# Patient Record
Sex: FEMALE | Race: WHITE | NOT HISPANIC OR LATINO
[De-identification: names, ages, dates, MRNs, and addresses within clinical notes are randomized per-mention and may not be internally consistent; named-entity substitution may affect disease eponyms.]

---

## 2018-01-12 ENCOUNTER — APPOINTMENT (OUTPATIENT)
Dept: PULMONOLOGY | Facility: CLINIC | Age: 63
End: 2018-01-12
Payer: MEDICARE

## 2018-01-12 VITALS
WEIGHT: 135 LBS | SYSTOLIC BLOOD PRESSURE: 112 MMHG | BODY MASS INDEX: 24.84 KG/M2 | HEART RATE: 90 BPM | DIASTOLIC BLOOD PRESSURE: 70 MMHG | HEIGHT: 62 IN | OXYGEN SATURATION: 98 % | RESPIRATION RATE: 14 BRPM

## 2018-01-12 DIAGNOSIS — Z86.79 PERSONAL HISTORY OF OTHER DISEASES OF THE CIRCULATORY SYSTEM: ICD-10-CM

## 2018-01-12 DIAGNOSIS — F17.200 NICOTINE DEPENDENCE, UNSPECIFIED, UNCOMPLICATED: ICD-10-CM

## 2018-01-12 DIAGNOSIS — Z86.39 PERSONAL HISTORY OF OTHER ENDOCRINE, NUTRITIONAL AND METABOLIC DISEASE: ICD-10-CM

## 2018-01-12 PROCEDURE — 94729 DIFFUSING CAPACITY: CPT

## 2018-01-12 PROCEDURE — 94060 EVALUATION OF WHEEZING: CPT

## 2018-01-12 PROCEDURE — 99205 OFFICE O/P NEW HI 60 MIN: CPT | Mod: 25

## 2018-01-12 PROCEDURE — 94727 GAS DIL/WSHOT DETER LNG VOL: CPT

## 2018-01-12 RX ORDER — LISINOPRIL 2.5 MG/1
2.5 TABLET ORAL
Refills: 0 | Status: ACTIVE | COMMUNITY

## 2018-01-12 RX ORDER — ATORVASTATIN CALCIUM 40 MG/1
40 TABLET, FILM COATED ORAL
Refills: 0 | Status: ACTIVE | COMMUNITY

## 2018-01-12 RX ORDER — ASPIRIN 81 MG
81 TABLET, DELAYED RELEASE (ENTERIC COATED) ORAL
Refills: 0 | Status: ACTIVE | COMMUNITY

## 2018-01-22 ENCOUNTER — APPOINTMENT (OUTPATIENT)
Dept: PULMONOLOGY | Facility: CLINIC | Age: 63
End: 2018-01-22
Payer: MEDICARE

## 2018-01-22 VITALS
BODY MASS INDEX: 24.84 KG/M2 | SYSTOLIC BLOOD PRESSURE: 120 MMHG | HEIGHT: 62 IN | TEMPERATURE: 98.2 F | OXYGEN SATURATION: 95 % | RESPIRATION RATE: 16 BRPM | WEIGHT: 135 LBS | HEART RATE: 83 BPM | DIASTOLIC BLOOD PRESSURE: 82 MMHG

## 2018-01-22 PROCEDURE — 99214 OFFICE O/P EST MOD 30 MIN: CPT

## 2018-01-22 RX ORDER — ALBUTEROL SULFATE 90 UG/1
108 (90 BASE) AEROSOL, METERED RESPIRATORY (INHALATION) EVERY 6 HOURS
Qty: 1 | Refills: 2 | Status: ACTIVE | COMMUNITY
Start: 2018-01-22 | End: 1900-01-01

## 2018-02-16 ENCOUNTER — APPOINTMENT (OUTPATIENT)
Dept: PULMONOLOGY | Facility: CLINIC | Age: 63
End: 2018-02-16
Payer: MEDICARE

## 2018-02-16 VITALS
SYSTOLIC BLOOD PRESSURE: 122 MMHG | RESPIRATION RATE: 16 BRPM | HEART RATE: 70 BPM | OXYGEN SATURATION: 95 % | DIASTOLIC BLOOD PRESSURE: 92 MMHG | TEMPERATURE: 97.9 F

## 2018-02-16 DIAGNOSIS — J45.909 UNSPECIFIED ASTHMA, UNCOMPLICATED: ICD-10-CM

## 2018-02-16 PROCEDURE — 99215 OFFICE O/P EST HI 40 MIN: CPT

## 2018-02-16 RX ORDER — MONTELUKAST 10 MG/1
10 TABLET, FILM COATED ORAL
Qty: 30 | Refills: 5 | Status: COMPLETED | COMMUNITY
Start: 2018-01-15 | End: 2018-02-16

## 2019-02-15 ENCOUNTER — TRANSCRIPTION ENCOUNTER (OUTPATIENT)
Age: 64
End: 2019-02-15

## 2019-02-15 ENCOUNTER — INPATIENT (INPATIENT)
Facility: HOSPITAL | Age: 64
LOS: 0 days | Discharge: HOME | DRG: 343 | End: 2019-02-16
Attending: SURGERY | Admitting: SURGERY
Payer: COMMERCIAL

## 2019-02-15 VITALS
HEART RATE: 72 BPM | HEIGHT: 62 IN | OXYGEN SATURATION: 98 % | SYSTOLIC BLOOD PRESSURE: 155 MMHG | DIASTOLIC BLOOD PRESSURE: 90 MMHG | RESPIRATION RATE: 17 BRPM | TEMPERATURE: 98 F | WEIGHT: 134.92 LBS

## 2019-02-15 DIAGNOSIS — K37 UNSPECIFIED APPENDICITIS: ICD-10-CM

## 2019-02-15 LAB
ALBUMIN SERPL ELPH-MCNC: 4.5 G/DL — SIGNIFICANT CHANGE UP (ref 3.3–5)
ALP SERPL-CCNC: 73 U/L — SIGNIFICANT CHANGE UP (ref 40–120)
ALT FLD-CCNC: 40 U/L — SIGNIFICANT CHANGE UP (ref 10–45)
ANION GAP SERPL CALC-SCNC: 14 MMOL/L — SIGNIFICANT CHANGE UP (ref 5–17)
APTT BLD: 25.8 SEC — LOW (ref 27.5–36.3)
AST SERPL-CCNC: 17 U/L — SIGNIFICANT CHANGE UP (ref 10–40)
BASOPHILS # BLD AUTO: 0 K/UL — SIGNIFICANT CHANGE UP (ref 0–0.2)
BASOPHILS NFR BLD AUTO: 0.1 % — SIGNIFICANT CHANGE UP (ref 0–2)
BILIRUB SERPL-MCNC: 0.4 MG/DL — SIGNIFICANT CHANGE UP (ref 0.2–1.2)
BUN SERPL-MCNC: 13 MG/DL — SIGNIFICANT CHANGE UP (ref 7–23)
CALCIUM SERPL-MCNC: 10.1 MG/DL — SIGNIFICANT CHANGE UP (ref 8.4–10.5)
CHLORIDE SERPL-SCNC: 104 MMOL/L — SIGNIFICANT CHANGE UP (ref 96–108)
CO2 SERPL-SCNC: 20 MMOL/L — LOW (ref 22–31)
CREAT SERPL-MCNC: 0.78 MG/DL — SIGNIFICANT CHANGE UP (ref 0.5–1.3)
EOSINOPHIL # BLD AUTO: 0 K/UL — SIGNIFICANT CHANGE UP (ref 0–0.5)
EOSINOPHIL NFR BLD AUTO: 0.2 % — SIGNIFICANT CHANGE UP (ref 0–6)
GLUCOSE SERPL-MCNC: 146 MG/DL — HIGH (ref 70–99)
HCT VFR BLD CALC: 45.8 % — HIGH (ref 34.5–45)
HGB BLD-MCNC: 15.9 G/DL — HIGH (ref 11.5–15.5)
INR BLD: 1.04 RATIO — SIGNIFICANT CHANGE UP (ref 0.88–1.16)
LIDOCAIN IGE QN: 22 U/L — SIGNIFICANT CHANGE UP (ref 7–60)
LYMPHOCYTES # BLD AUTO: 0.7 K/UL — LOW (ref 1–3.3)
LYMPHOCYTES # BLD AUTO: 5.9 % — LOW (ref 13–44)
MCHC RBC-ENTMCNC: 29.4 PG — SIGNIFICANT CHANGE UP (ref 27–34)
MCHC RBC-ENTMCNC: 34.7 GM/DL — SIGNIFICANT CHANGE UP (ref 32–36)
MCV RBC AUTO: 84.8 FL — SIGNIFICANT CHANGE UP (ref 80–100)
MONOCYTES # BLD AUTO: 0.3 K/UL — SIGNIFICANT CHANGE UP (ref 0–0.9)
MONOCYTES NFR BLD AUTO: 3 % — SIGNIFICANT CHANGE UP (ref 2–14)
NEUTROPHILS # BLD AUTO: 10.3 K/UL — HIGH (ref 1.8–7.4)
NEUTROPHILS NFR BLD AUTO: 90.8 % — HIGH (ref 43–77)
PLATELET # BLD AUTO: 185 K/UL — SIGNIFICANT CHANGE UP (ref 150–400)
POTASSIUM SERPL-MCNC: 4.1 MMOL/L — SIGNIFICANT CHANGE UP (ref 3.5–5.3)
POTASSIUM SERPL-SCNC: 4.1 MMOL/L — SIGNIFICANT CHANGE UP (ref 3.5–5.3)
PROT SERPL-MCNC: 7.2 G/DL — SIGNIFICANT CHANGE UP (ref 6–8.3)
PROTHROM AB SERPL-ACNC: 11.9 SEC — SIGNIFICANT CHANGE UP (ref 10–12.9)
RBC # BLD: 5.4 M/UL — HIGH (ref 3.8–5.2)
RBC # FLD: 13.1 % — SIGNIFICANT CHANGE UP (ref 10.3–14.5)
SODIUM SERPL-SCNC: 138 MMOL/L — SIGNIFICANT CHANGE UP (ref 135–145)
WBC # BLD: 11.3 K/UL — HIGH (ref 3.8–10.5)
WBC # FLD AUTO: 11.3 K/UL — HIGH (ref 3.8–10.5)

## 2019-02-15 PROCEDURE — 44970 LAPAROSCOPY APPENDECTOMY: CPT

## 2019-02-15 PROCEDURE — 93010 ELECTROCARDIOGRAM REPORT: CPT

## 2019-02-15 PROCEDURE — 99221 1ST HOSP IP/OBS SF/LOW 40: CPT | Mod: 57

## 2019-02-15 PROCEDURE — 74177 CT ABD & PELVIS W/CONTRAST: CPT | Mod: 26

## 2019-02-15 PROCEDURE — 99285 EMERGENCY DEPT VISIT HI MDM: CPT | Mod: GC,25

## 2019-02-15 RX ORDER — IPRATROPIUM/ALBUTEROL SULFATE 18-103MCG
3 AEROSOL WITH ADAPTER (GRAM) INHALATION EVERY 6 HOURS
Qty: 0 | Refills: 0 | Status: DISCONTINUED | OUTPATIENT
Start: 2019-02-15 | End: 2019-02-16

## 2019-02-15 RX ORDER — FAMOTIDINE 10 MG/ML
40 INJECTION INTRAVENOUS
Qty: 0 | Refills: 0 | Status: DISCONTINUED | OUTPATIENT
Start: 2019-02-16 | End: 2019-02-16

## 2019-02-15 RX ORDER — BUDESONIDE AND FORMOTEROL FUMARATE DIHYDRATE 160; 4.5 UG/1; UG/1
2 AEROSOL RESPIRATORY (INHALATION)
Qty: 0 | Refills: 0 | Status: DISCONTINUED | OUTPATIENT
Start: 2019-02-15 | End: 2019-02-16

## 2019-02-15 RX ORDER — ASPIRIN/CALCIUM CARB/MAGNESIUM 324 MG
81 TABLET ORAL DAILY
Qty: 0 | Refills: 0 | Status: DISCONTINUED | OUTPATIENT
Start: 2019-02-15 | End: 2019-02-15

## 2019-02-15 RX ORDER — PROCHLORPERAZINE MALEATE 5 MG
10 TABLET ORAL ONCE
Qty: 0 | Refills: 0 | Status: COMPLETED | OUTPATIENT
Start: 2019-02-15 | End: 2019-02-15

## 2019-02-15 RX ORDER — SODIUM CHLORIDE 9 MG/ML
3 INJECTION INTRAMUSCULAR; INTRAVENOUS; SUBCUTANEOUS ONCE
Qty: 0 | Refills: 0 | Status: COMPLETED | OUTPATIENT
Start: 2019-02-15 | End: 2019-02-15

## 2019-02-15 RX ORDER — ATORVASTATIN CALCIUM 80 MG/1
40 TABLET, FILM COATED ORAL AT BEDTIME
Qty: 0 | Refills: 0 | Status: DISCONTINUED | OUTPATIENT
Start: 2019-02-15 | End: 2019-02-16

## 2019-02-15 RX ORDER — LIDOCAINE 4 G/100G
15 CREAM TOPICAL ONCE
Qty: 0 | Refills: 0 | Status: COMPLETED | OUTPATIENT
Start: 2019-02-15 | End: 2019-02-15

## 2019-02-15 RX ORDER — FAMOTIDINE 10 MG/ML
20 INJECTION INTRAVENOUS ONCE
Qty: 0 | Refills: 0 | Status: COMPLETED | OUTPATIENT
Start: 2019-02-15 | End: 2019-02-15

## 2019-02-15 RX ORDER — PIPERACILLIN AND TAZOBACTAM 4; .5 G/20ML; G/20ML
3.38 INJECTION, POWDER, LYOPHILIZED, FOR SOLUTION INTRAVENOUS ONCE
Qty: 0 | Refills: 0 | Status: COMPLETED | OUTPATIENT
Start: 2019-02-15 | End: 2019-02-15

## 2019-02-15 RX ORDER — ENOXAPARIN SODIUM 100 MG/ML
40 INJECTION SUBCUTANEOUS DAILY
Qty: 0 | Refills: 0 | Status: DISCONTINUED | OUTPATIENT
Start: 2019-02-15 | End: 2019-02-16

## 2019-02-15 RX ORDER — ONDANSETRON 8 MG/1
4 TABLET, FILM COATED ORAL ONCE
Qty: 0 | Refills: 0 | Status: COMPLETED | OUTPATIENT
Start: 2019-02-15 | End: 2019-02-15

## 2019-02-15 RX ORDER — SODIUM CHLORIDE 9 MG/ML
1000 INJECTION INTRAMUSCULAR; INTRAVENOUS; SUBCUTANEOUS ONCE
Qty: 0 | Refills: 0 | Status: COMPLETED | OUTPATIENT
Start: 2019-02-15 | End: 2019-02-15

## 2019-02-15 RX ORDER — METOCLOPRAMIDE HCL 10 MG
10 TABLET ORAL ONCE
Qty: 0 | Refills: 0 | Status: COMPLETED | OUTPATIENT
Start: 2019-02-15 | End: 2019-02-15

## 2019-02-15 RX ORDER — LISINOPRIL 2.5 MG/1
2.5 TABLET ORAL DAILY
Qty: 0 | Refills: 0 | Status: DISCONTINUED | OUTPATIENT
Start: 2019-02-15 | End: 2019-02-16

## 2019-02-15 RX ADMIN — Medication 10 MILLIGRAM(S): at 19:54

## 2019-02-15 RX ADMIN — Medication 10 MILLIGRAM(S): at 17:51

## 2019-02-15 RX ADMIN — SODIUM CHLORIDE 1000 MILLILITER(S): 9 INJECTION INTRAMUSCULAR; INTRAVENOUS; SUBCUTANEOUS at 19:54

## 2019-02-15 RX ADMIN — SODIUM CHLORIDE 3 MILLILITER(S): 9 INJECTION INTRAMUSCULAR; INTRAVENOUS; SUBCUTANEOUS at 16:43

## 2019-02-15 RX ADMIN — PIPERACILLIN AND TAZOBACTAM 200 GRAM(S): 4; .5 INJECTION, POWDER, LYOPHILIZED, FOR SOLUTION INTRAVENOUS at 21:43

## 2019-02-15 RX ADMIN — ONDANSETRON 4 MILLIGRAM(S): 8 TABLET, FILM COATED ORAL at 16:57

## 2019-02-15 RX ADMIN — ONDANSETRON 4 MILLIGRAM(S): 8 TABLET, FILM COATED ORAL at 16:37

## 2019-02-15 RX ADMIN — SODIUM CHLORIDE 1000 MILLILITER(S): 9 INJECTION INTRAMUSCULAR; INTRAVENOUS; SUBCUTANEOUS at 16:37

## 2019-02-15 RX ADMIN — LIDOCAINE 15 MILLILITER(S): 4 CREAM TOPICAL at 17:51

## 2019-02-15 RX ADMIN — FAMOTIDINE 20 MILLIGRAM(S): 10 INJECTION INTRAVENOUS at 16:37

## 2019-02-15 NOTE — H&P ADULT - NSHPLABSRESULTS_GEN_ALL_CORE
15.9   11.3  )-----------( 185      ( 15 Feb 2019 16:43 )             45.8   02-15    138  |  104  |  13  ----------------------------<  146<H>  4.1   |  20<L>  |  0.78    Ca    10.1      15 Feb 2019 16:43    TPro  7.2  /  Alb  4.5  /  TBili  0.4  /  DBili  x   /  AST  17  /  ALT  40  /  AlkPhos  73  02-15  < from: CT Abdomen and Pelvis w/ IV Cont (02.15.19 @ 20:24) >    FINDINGS:    LOWER CHEST: Within normal limits.    LIVER: Within normal limits.  BILE DUCTS: Normal caliber.  GALLBLADDER: Cholecystectomy.  SPLEEN: Within normal limits.  PANCREAS: Within normal limits.  ADRENALS: Within normal limits.  KIDNEYS/URETERS: Within normal limits. No hydronephrosis.    BLADDER: Within normal limits.  REPRODUCTIVE ORGANS: Markedly enlarged uterus with heterogeneous central   hyperenhancement measuring approximately 11 cm in diameter.. The   bilateral adnexa are unremarkable.    BOWEL: No bowel obstruction. Appendix is dilated, demonstrating the   presence of an appendicolith at its base. Periappendiceal fat stranding   is present. Findings are consistent with acute appendicitis. No evidence   of periappendiceal abscess. Diverticulosis.  PERITONEUM: No ascites.  VESSELS:  Within normal limits.  RETROPERITONEUM: No lymphadenopathy.    ABDOMINAL WALL: Within normal limits.  BONES: Within normal limits.    IMPRESSION:   Acute nonperforated appendicitis.    Markedly enlarged uterus with heterogeneous central enhancement. While   findings may be related to large fibroid, endometrial malignancy is also   a consideration. Correlate with pelvic sonography.    < end of copied text >

## 2019-02-15 NOTE — ED PROVIDER NOTE - CLINICAL SUMMARY MEDICAL DECISION MAKING FREE TEXT BOX
.an 63F known hx of GERD p/w mulitple episodes n/v since this AM assoc w/ GERD type burning CP. Hypertensive, vs otherwise stable. Likely GI, will r/o cardiac etiology w/ EKG. labs, vbg, fluids, gi cocktail, reasssess.

## 2019-02-15 NOTE — ED PROVIDER NOTE - ATTENDING CONTRIBUTION TO CARE
62 y/o female with h/o gastritis s/p egd 6 months ago, fibroids, choley 5/18 presents with n/v/d started earlier this morning, cant keep anything down, abd pain lower mild chronic from fibroids, otherwise nt abdomin, ivf, antiemetics, labs, reassess, no sick contacts, recent travel.

## 2019-02-15 NOTE — H&P ADULT - HISTORY OF PRESENT ILLNESS
63F PMHx Asthma, GERD presenting for 1 day n/v. At around 7 am this morning had acute onset of nasue with emesis, felt like GERD episode tried the medication that usually helps with no relief. Nausea and vomiting persisted and patient had some episodes of diarrhea. Given no improvement in symptoms by afternoon decided to come into ED. Initially worked-up more [place] gastritis vs GERD symptoms, by evening given persistent pain patient was ordered for CT A/P. Of note labs on presentation were notable for leukocytosis with left shift and , but otherwise unremarkable. CT demonstrated acute noncomplicated appendicitis with appendicolith and fat stranding. Pt received dose of Zosyn following diagnosis of appendicitis. Admitted to surgery now for operative intervention

## 2019-02-15 NOTE — H&P ADULT - ASSESSMENT
63F w/ PMHX as noted p/w n/v x 1 day now with CT demonstrating acute appendicitis , otherwise hemodynamically stable     - Admit to Dr. Camejo  - Add-on for emergent laparoscopic appendectomy w/ Dr. Camejo  - NPO/ IVF  - c/w home meds  - DVT ppx     S Oswaldo PGY-2  ACS [id number]

## 2019-02-15 NOTE — ED PROVIDER NOTE - OBJECTIVE STATEMENT
63F hx htn, GERD, hld, p/w burning cp and nausea/vomiting (nbnb 5-6x) since this AM assoc w/ bitter taste in her mouth. Pt took bentol at home hoping for relief without luck. Only abdominal pain with vomitng, crampy, diffuse. No sick contacts, recent travel, new foods, no one else with same symptoms. Feels dehydrated. Denies trouble breathing, fever, falls/pre-syncope.

## 2019-02-15 NOTE — H&P ADULT - NSHPPHYSICALEXAM_GEN_ALL_CORE
Gen: NAD  Resp: Breathing comfortably on RA  GI: soft, nondistended, TTP of epigastrium and RLQ. No rebound or guarding.   Ext: warm, moving all ext

## 2019-02-15 NOTE — ED ADULT NURSE NOTE - OBJECTIVE STATEMENT
64 yo female presents to the ED from home c/o N/V and loose stools presents to the ED from 0700 today. patient states she is having generalized abdominal pain/ spasm and 62 yo female presents to the ED from home c/o N/V and loose stools presents to the ED from 0700 today. patient states she is having generalized abdominal pain/ spasm. patient is AAOx3. lung sounds clear. cap refill <3sec. patient denies HA, dizziness, bloody stools, cough, back pain, dysuria, hematuria. abdomen is soft, tender, non-distended. patient is hypertensive in ED. MD at the bedside.

## 2019-02-15 NOTE — ED PROVIDER NOTE - PROGRESS NOTE DETAILS
Emre Castillo PGY2: patient re-evaluated - remains nauseous, minimally improved from prior; low abd remains tender (at baseline), mild epigastric tenderness; CTAP of low yield however d/w patient and ** Emre Castillo PGY2: surg requested admission to Dr. Cuenca

## 2019-02-15 NOTE — H&P ADULT - ATTENDING COMMENTS
Pt seen and examined. Agree with A/P. Admit to [facility], floor. NPO, IVF, abx, plan for lap yasmani. Pt seen and examined. Agree with A/P. Admit to [facility], floor. NPO, IVF, abx, plan for lap appy.

## 2019-02-15 NOTE — ED ADULT NURSE REASSESSMENT NOTE - NS ED NURSE REASSESS COMMENT FT1
Report received from ILAN Barr  Pt resting comfortably with family at bedside.   PO Challenge success  Safety maintained at all times, bed in lowest position, call bell in hand.  Will continue to monitor closely.   pending d/c paperwork

## 2019-02-16 ENCOUNTER — TRANSCRIPTION ENCOUNTER (OUTPATIENT)
Age: 64
End: 2019-02-16

## 2019-02-16 VITALS
SYSTOLIC BLOOD PRESSURE: 110 MMHG | OXYGEN SATURATION: 93 % | RESPIRATION RATE: 18 BRPM | HEART RATE: 92 BPM | TEMPERATURE: 98 F | DIASTOLIC BLOOD PRESSURE: 70 MMHG

## 2019-02-16 LAB
BLD GP AB SCN SERPL QL: NEGATIVE — SIGNIFICANT CHANGE UP
RH IG SCN BLD-IMP: POSITIVE — SIGNIFICANT CHANGE UP

## 2019-02-16 PROCEDURE — 44970 LAPAROSCOPY APPENDECTOMY: CPT

## 2019-02-16 PROCEDURE — 85610 PROTHROMBIN TIME: CPT

## 2019-02-16 PROCEDURE — 93005 ELECTROCARDIOGRAM TRACING: CPT

## 2019-02-16 PROCEDURE — 85027 COMPLETE CBC AUTOMATED: CPT

## 2019-02-16 PROCEDURE — 85730 THROMBOPLASTIN TIME PARTIAL: CPT

## 2019-02-16 PROCEDURE — 94640 AIRWAY INHALATION TREATMENT: CPT

## 2019-02-16 PROCEDURE — 74177 CT ABD & PELVIS W/CONTRAST: CPT

## 2019-02-16 PROCEDURE — 82962 GLUCOSE BLOOD TEST: CPT

## 2019-02-16 PROCEDURE — 83690 ASSAY OF LIPASE: CPT

## 2019-02-16 PROCEDURE — 86901 BLOOD TYPING SEROLOGIC RH(D): CPT

## 2019-02-16 PROCEDURE — 86850 RBC ANTIBODY SCREEN: CPT

## 2019-02-16 PROCEDURE — 86900 BLOOD TYPING SEROLOGIC ABO: CPT

## 2019-02-16 PROCEDURE — 96374 THER/PROPH/DIAG INJ IV PUSH: CPT | Mod: XU

## 2019-02-16 PROCEDURE — 99285 EMERGENCY DEPT VISIT HI MDM: CPT | Mod: 25

## 2019-02-16 PROCEDURE — 96375 TX/PRO/DX INJ NEW DRUG ADDON: CPT

## 2019-02-16 PROCEDURE — 80053 COMPREHEN METABOLIC PANEL: CPT

## 2019-02-16 RX ORDER — OXYCODONE HYDROCHLORIDE 5 MG/1
0.5 TABLET ORAL
Qty: 4 | Refills: 0
Start: 2019-02-16 | End: 2019-02-17

## 2019-02-16 RX ORDER — ACETAMINOPHEN 500 MG
2 TABLET ORAL
Qty: 0 | Refills: 0 | DISCHARGE
Start: 2019-02-16

## 2019-02-16 RX ORDER — ATORVASTATIN CALCIUM 80 MG/1
1 TABLET, FILM COATED ORAL
Qty: 0 | Refills: 0 | DISCHARGE
Start: 2019-02-16

## 2019-02-16 RX ORDER — BUDESONIDE AND FORMOTEROL FUMARATE DIHYDRATE 160; 4.5 UG/1; UG/1
2 AEROSOL RESPIRATORY (INHALATION)
Qty: 0 | Refills: 0 | Status: DISCONTINUED | OUTPATIENT
Start: 2019-02-16 | End: 2019-02-16

## 2019-02-16 RX ORDER — ACETAMINOPHEN 500 MG
975 TABLET ORAL EVERY 6 HOURS
Qty: 0 | Refills: 0 | Status: DISCONTINUED | OUTPATIENT
Start: 2019-02-16 | End: 2019-02-16

## 2019-02-16 RX ORDER — FAMOTIDINE 10 MG/ML
1 INJECTION INTRAVENOUS
Qty: 0 | Refills: 0 | DISCHARGE
Start: 2019-02-16

## 2019-02-16 RX ORDER — SODIUM CHLORIDE 9 MG/ML
1000 INJECTION, SOLUTION INTRAVENOUS
Qty: 0 | Refills: 0 | Status: DISCONTINUED | OUTPATIENT
Start: 2019-02-16 | End: 2019-02-16

## 2019-02-16 RX ORDER — ENOXAPARIN SODIUM 100 MG/ML
40 INJECTION SUBCUTANEOUS DAILY
Qty: 0 | Refills: 0 | Status: DISCONTINUED | OUTPATIENT
Start: 2019-02-16 | End: 2019-02-16

## 2019-02-16 RX ORDER — ONDANSETRON 8 MG/1
4 TABLET, FILM COATED ORAL ONCE
Qty: 0 | Refills: 0 | Status: DISCONTINUED | OUTPATIENT
Start: 2019-02-16 | End: 2019-02-16

## 2019-02-16 RX ORDER — HYDROMORPHONE HYDROCHLORIDE 2 MG/ML
0.5 INJECTION INTRAMUSCULAR; INTRAVENOUS; SUBCUTANEOUS
Qty: 0 | Refills: 0 | Status: DISCONTINUED | OUTPATIENT
Start: 2019-02-16 | End: 2019-02-16

## 2019-02-16 RX ORDER — ATORVASTATIN CALCIUM 80 MG/1
40 TABLET, FILM COATED ORAL AT BEDTIME
Qty: 0 | Refills: 0 | Status: DISCONTINUED | OUTPATIENT
Start: 2019-02-16 | End: 2019-02-16

## 2019-02-16 RX ORDER — BUDESONIDE AND FORMOTEROL FUMARATE DIHYDRATE 160; 4.5 UG/1; UG/1
2 AEROSOL RESPIRATORY (INHALATION)
Qty: 0 | Refills: 0 | DISCHARGE
Start: 2019-02-16

## 2019-02-16 RX ORDER — ASPIRIN/CALCIUM CARB/MAGNESIUM 324 MG
1 TABLET ORAL
Qty: 0 | Refills: 0 | DISCHARGE
Start: 2019-02-16

## 2019-02-16 RX ORDER — FAMOTIDINE 10 MG/ML
40 INJECTION INTRAVENOUS
Qty: 0 | Refills: 0 | Status: DISCONTINUED | OUTPATIENT
Start: 2019-02-16 | End: 2019-02-16

## 2019-02-16 RX ORDER — ASPIRIN/CALCIUM CARB/MAGNESIUM 324 MG
81 TABLET ORAL DAILY
Qty: 0 | Refills: 0 | Status: DISCONTINUED | OUTPATIENT
Start: 2019-02-16 | End: 2019-02-16

## 2019-02-16 RX ORDER — OXYCODONE HYDROCHLORIDE 5 MG/1
5 TABLET ORAL ONCE
Qty: 0 | Refills: 0 | Status: DISCONTINUED | OUTPATIENT
Start: 2019-02-16 | End: 2019-02-16

## 2019-02-16 RX ADMIN — SODIUM CHLORIDE 100 MILLILITER(S): 9 INJECTION, SOLUTION INTRAVENOUS at 02:22

## 2019-02-16 RX ADMIN — ENOXAPARIN SODIUM 40 MILLIGRAM(S): 100 INJECTION SUBCUTANEOUS at 02:00

## 2019-02-16 RX ADMIN — Medication 81 MILLIGRAM(S): at 05:03

## 2019-02-16 RX ADMIN — ENOXAPARIN SODIUM 40 MILLIGRAM(S): 100 INJECTION SUBCUTANEOUS at 12:58

## 2019-02-16 RX ADMIN — HYDROMORPHONE HYDROCHLORIDE 0.5 MILLIGRAM(S): 2 INJECTION INTRAMUSCULAR; INTRAVENOUS; SUBCUTANEOUS at 05:15

## 2019-02-16 RX ADMIN — HYDROMORPHONE HYDROCHLORIDE 0.5 MILLIGRAM(S): 2 INJECTION INTRAMUSCULAR; INTRAVENOUS; SUBCUTANEOUS at 05:03

## 2019-02-16 RX ADMIN — SODIUM CHLORIDE 100 MILLILITER(S): 9 INJECTION, SOLUTION INTRAVENOUS at 04:58

## 2019-02-16 RX ADMIN — BUDESONIDE AND FORMOTEROL FUMARATE DIHYDRATE 2 PUFF(S): 160; 4.5 AEROSOL RESPIRATORY (INHALATION) at 05:03

## 2019-02-16 RX ADMIN — Medication 3 MILLILITER(S): at 02:00

## 2019-02-16 RX ADMIN — BUDESONIDE AND FORMOTEROL FUMARATE DIHYDRATE 2 PUFF(S): 160; 4.5 AEROSOL RESPIRATORY (INHALATION) at 01:59

## 2019-02-16 NOTE — DISCHARGE NOTE ADULT - PRINCIPAL DIAGNOSIS
Acute appendicitis with localized peritonitis, without perforation or gangrene, unspecified whether abscess present

## 2019-02-16 NOTE — PROGRESS NOTE ADULT - SUBJECTIVE AND OBJECTIVE BOX
[facility] ATP SURGERY DAILY PROGRESS NOTE    SUBJECTIVE:  -  doing well since surgery this morning  -  tolerating regular diet    OBJECTIVE:    Vital Signs Last 24 Hrs  T(C): 36.7 (16 Feb 2019 11:00), Max: 36.7 (15 Feb 2019 16:31)  T(F): 98 (16 Feb 2019 11:00), Max: 98.1 (16 Feb 2019 04:40)  HR: 92 (16 Feb 2019 11:00) (72 - 104)  BP: 122/72 (16 Feb 2019 11:00) (106/61 - 155/90)  BP(mean): 83 (16 Feb 2019 06:00) (79 - 98)  RR: 18 (16 Feb 2019 11:00) (15 - 18)  SpO2: 95% (16 Feb 2019 11:00) (95% - 100%)    I&O's Detail    15 Feb 2019 07:01  -  16 Feb 2019 07:00  --------------------------------------------------------  IN:    lactated ringers.: 200 mL  Total IN: 200 mL    OUT:  Total OUT: 0 mL    Total NET: 200 mL      16 Feb 2019 07:01  -  16 Feb 2019 13:28  --------------------------------------------------------  IN:    lactated ringers.: 600 mL    Oral Fluid: 480 mL  Total IN: 1080 mL    OUT:    Voided: 900 mL  Total OUT: 900 mL    Total NET: 180 mL        LABS:                        15.9   11.3  )-----------( 185      ( 15 Feb 2019 16:43 )             45.8     02-15    138  |  104  |  13  ----------------------------<  146<H>  4.1   |  20<L>  |  0.78    Ca    10.1      15 Feb 2019 16:43    TPro  7.2  /  Alb  4.5  /  TBili  0.4  /  DBili  x   /  AST  17  /  ALT  40  /  AlkPhos  73  02-15    EXAM:  Gen:       alert, in NAD  Lungs:       unlabored breathing  CV:       regular rate, rhythm  Abd:       nondistended, appropriately tender over surgical site                lap port sites with steri strips placed, with minimal sanguinous spotting  Ext:        no edema

## 2019-02-16 NOTE — DISCHARGE NOTE ADULT - HOSPITAL COURSE
62yo F admitted for acute appendicitis 2/15/19, underwent laparoscopic appendectomy on 2/16 without issue. Post-op, pt tolerating regular diet, hemodynamically stable, pain controlled on oral regimen. Will follow up with Dr. Camejo in 1-2 weeks in clinic.

## 2019-02-16 NOTE — CHART NOTE - NSCHARTNOTEFT_GEN_A_CORE
GENERAL SURGERY POST-OP CHECK:     Surgery:       s/p  lap appy   2/16  Operating Surgeon:      Nell      SUBJECTIVE:    The patient was seen and examined at bedside.  No incidents of note from the PACU team.    General:       answering questions, resting comfortably  Pain:       well-controlled with pain regimen  Diet:       tolerating reg diet with no nausea or vomiting  Bowels:     denies flatus  Wound site:      some sanguinous spotting in umbilicus but no active pooling  Denies:      cp, sob, N/V, diarrhea, leg pain    Subjective concerns:    none      OBJECTIVE:    T(C): 36.6 (02-16-19 @ 10:00), Max: 36.7 (02-15-19 @ 16:31)  HR: 90 (02-16-19 @ 10:00) (72 - 104)  BP: 119/70 (02-16-19 @ 10:00) (106/61 - 155/90)  RR: 18 (02-16-19 @ 10:00) (15 - 18)  SpO2: 96% (02-16-19 @ 10:00) (95% - 100%)    PHYSICAL EXAM:   Gen:       alert, in NAD  Lungs:       unlabored breathing  CV:       regular rate, rhythm  Abd:       nondistended, appropriately tender over surgical site                lap port sites with steri strips placed, with minimal drainage  Ext:        no edema    PLAN:  -  c/w reg diet  -  pain control: prefers minimal oxycodone  -  home today if tolerating diet and pain controlled    [facility] SUR[facility]  [id number]

## 2019-02-16 NOTE — DISCHARGE NOTE ADULT - CARE PROVIDER_API CALL
Herminio Camejo)  Surgery  [address] [place] [address]  Phone: [phone]  Fax: [phone]  Follow Up Time:

## 2019-02-16 NOTE — DISCHARGE NOTE ADULT - CARE PLAN
Principal Discharge DX:	Acute appendicitis with localized peritonitis, without perforation or gangrene, unspecified whether abscess present  Goal:	remove appendix  Assessment and plan of treatment:	same day surgery

## 2019-02-16 NOTE — DISCHARGE NOTE ADULT - MEDICATION SUMMARY - MEDICATIONS TO TAKE
I will START or STAY ON the medications listed below when I get home from the hospital:    acetaminophen 325 mg oral tablet  -- 2 tab(s) by mouth every 6 hours  -- Indication: For post-op pain    Motrin 400 mg oral tablet  -- 1 tab(s) by mouth every 6 hours, As Needed for moderate pain  -- Indication: For post-op pain    oxyCODONE 5 mg oral tablet  -- 0.5 tab(s) by mouth every 6 hours, As Needed -for severe pain MDD:2 tablets   -- Caution federal law prohibits the transfer of this drug to any person other  than the person for whom it was prescribed.  It is very important that you take or use this exactly as directed.  Do not skip doses or discontinue unless directed by your doctor.  May cause drowsiness.  Alcohol may intensify this effect.  Use care when operating dangerous machinery.  This prescription cannot be refilled.  Using more of this medication than prescribed may cause serious breathing problems.    -- Indication: For post-op pain    aspirin 81 mg oral tablet, chewable  -- 1 tab(s) by mouth once a day  -- Indication: For prventative health    atorvastatin 40 mg oral tablet  -- 1 tab(s) by mouth once a day (at bedtime)  -- Indication: For cholesterol    budesonide-formoterol 160 mcg-4.5 mcg/inh inhalation aerosol  -- 2 puff(s) inhaled 2 times a day  -- Indication: For wheezing    famotidine 40 mg oral tablet  -- 1 tab(s) by mouth 2 times a day  -- Indication: For reflux

## 2019-02-16 NOTE — DISCHARGE NOTE ADULT - PATIENT PORTAL LINK FT
You can access the [facility] Patient Portal, offered by [facility], by registering with the following website: [email or web address]

## 2019-02-16 NOTE — PROGRESS NOTE ADULT - ASSESSMENT
64yo F s/p lap appendectomy (2/16) for acute appendicitis    PLAN:  -  wound looks better after reinforcement, no active bleeding  -  c/w reg diet  -  pain control  -  home today if tolerating diet    [facility] SURG[facility]  [id number]

## 2019-02-16 NOTE — DISCHARGE NOTE ADULT - ADDITIONAL INSTRUCTIONS
No
FOLLOW-UP:  Please call [phone] to schedule a follow-up appointment with your surgeon,  Dr. Camejo in 1-2 weeks.    How do I take care of my incision?  -  Keep the wound completely dry for the first 2 days after the surgery.  -  You may shower and/or sponge bathe after 2 days.  -  Clean the area with warm soapy water, and DO NOT RUB the area.  -  Please do not submerge your wound underwater for 2 weeks (no baths).    When should I call my doctor?  -  If you have increased redness, or purple streaking along the incision.  -  If you have an oral temperature over 100.4 degrees.  -  If you have any yellowish or greenish drainage from the incisions or notice a foul odor.  -  If you have bleeding from the incisions that is difficult to control with light pressure.  -  If you have severe or increased pain that is not controlled by taking the discharge pain medications as prescribed.

## 2019-02-16 NOTE — BRIEF OPERATIVE NOTE - PROCEDURE
<<-----Click on this checkbox to enter Procedure Laparoscopic cholecystectomy  02/16/2019    Active  [id number] Laparoscopic appendectomy  02/16/2019    Active  [id number]

## 2019-02-18 ENCOUNTER — RESULT REVIEW (OUTPATIENT)
Age: 64
End: 2019-02-18

## 2019-02-20 LAB — SURGICAL PATHOLOGY STUDY: SIGNIFICANT CHANGE UP

## 2019-03-03 ENCOUNTER — MOBILE ON CALL (OUTPATIENT)
Age: 64
End: 2019-03-03

## 2019-03-07 ENCOUNTER — APPOINTMENT (OUTPATIENT)
Dept: TRAUMA SURGERY | Facility: CLINIC | Age: 64
End: 2019-03-07
Payer: MEDICARE

## 2019-03-07 VITALS
HEART RATE: 74 BPM | WEIGHT: 135 LBS | HEIGHT: 62 IN | SYSTOLIC BLOOD PRESSURE: 143 MMHG | BODY MASS INDEX: 24.84 KG/M2 | DIASTOLIC BLOOD PRESSURE: 90 MMHG | TEMPERATURE: 97.9 F

## 2019-03-07 DIAGNOSIS — K35.30 ACUTE APPENDICITIS W/ LOCALIZED PERITONITIS, W/O PERFORATION OR GANGRENE: ICD-10-CM

## 2019-03-07 PROCEDURE — 99024 POSTOP FOLLOW-UP VISIT: CPT

## 2020-02-29 ENCOUNTER — INPATIENT (INPATIENT)
Facility: HOSPITAL | Age: 65
LOS: 1 days | Discharge: HOME | End: 2020-03-02
Attending: HOSPITALIST | Admitting: HOSPITALIST
Payer: MEDICARE

## 2020-02-29 VITALS — OXYGEN SATURATION: 69 % | HEART RATE: 120 BPM

## 2020-02-29 DIAGNOSIS — K21.9 GASTRO-ESOPHAGEAL REFLUX DISEASE WITHOUT ESOPHAGITIS: ICD-10-CM

## 2020-02-29 DIAGNOSIS — J45.901 UNSPECIFIED ASTHMA WITH (ACUTE) EXACERBATION: ICD-10-CM

## 2020-02-29 DIAGNOSIS — J45.909 UNSPECIFIED ASTHMA, UNCOMPLICATED: ICD-10-CM

## 2020-02-29 DIAGNOSIS — Z29.9 ENCOUNTER FOR PROPHYLACTIC MEASURES, UNSPECIFIED: ICD-10-CM

## 2020-02-29 DIAGNOSIS — M54.13 RADICULOPATHY, CERVICOTHORACIC REGION: ICD-10-CM

## 2020-02-29 DIAGNOSIS — Z02.9 ENCOUNTER FOR ADMINISTRATIVE EXAMINATIONS, UNSPECIFIED: ICD-10-CM

## 2020-02-29 DIAGNOSIS — A41.9 SEPSIS, UNSPECIFIED ORGANISM: ICD-10-CM

## 2020-02-29 DIAGNOSIS — J11.1 INFLUENZA DUE TO UNIDENTIFIED INFLUENZA VIRUS WITH OTHER RESPIRATORY MANIFESTATIONS: ICD-10-CM

## 2020-02-29 DIAGNOSIS — I10 ESSENTIAL (PRIMARY) HYPERTENSION: ICD-10-CM

## 2020-02-29 DIAGNOSIS — Z90.49 ACQUIRED ABSENCE OF OTHER SPECIFIED PARTS OF DIGESTIVE TRACT: Chronic | ICD-10-CM

## 2020-02-29 DIAGNOSIS — J18.9 PNEUMONIA, UNSPECIFIED ORGANISM: ICD-10-CM

## 2020-02-29 LAB
ALBUMIN SERPL ELPH-MCNC: 4.4 G/DL — SIGNIFICANT CHANGE UP (ref 3.3–5)
ALP SERPL-CCNC: 88 U/L — SIGNIFICANT CHANGE UP (ref 40–120)
ALT FLD-CCNC: 90 U/L — HIGH (ref 4–33)
ANION GAP SERPL CALC-SCNC: 13 MMO/L — SIGNIFICANT CHANGE UP (ref 7–14)
APTT BLD: 29.6 SEC — SIGNIFICANT CHANGE UP (ref 27.5–36.3)
AST SERPL-CCNC: 58 U/L — HIGH (ref 4–32)
B PERT DNA SPEC QL NAA+PROBE: NOT DETECTED — SIGNIFICANT CHANGE UP
BASE EXCESS BLDV CALC-SCNC: -2 MMOL/L — SIGNIFICANT CHANGE UP
BASE EXCESS BLDV CALC-SCNC: -5.8 MMOL/L — SIGNIFICANT CHANGE UP
BASOPHILS # BLD AUTO: 0.05 K/UL — SIGNIFICANT CHANGE UP (ref 0–0.2)
BASOPHILS NFR BLD AUTO: 0.6 % — SIGNIFICANT CHANGE UP (ref 0–2)
BILIRUB SERPL-MCNC: 0.4 MG/DL — SIGNIFICANT CHANGE UP (ref 0.2–1.2)
BLOOD GAS VENOUS - CREATININE: 0.81 MG/DL — SIGNIFICANT CHANGE UP (ref 0.5–1.3)
BLOOD GAS VENOUS - CREATININE: 0.91 MG/DL — SIGNIFICANT CHANGE UP (ref 0.5–1.3)
BUN SERPL-MCNC: 10 MG/DL — SIGNIFICANT CHANGE UP (ref 7–23)
C PNEUM DNA SPEC QL NAA+PROBE: NOT DETECTED — SIGNIFICANT CHANGE UP
CALCIUM SERPL-MCNC: 9.8 MG/DL — SIGNIFICANT CHANGE UP (ref 8.4–10.5)
CHLORIDE BLDV-SCNC: 108 MMOL/L — SIGNIFICANT CHANGE UP (ref 96–108)
CHLORIDE BLDV-SCNC: 115 MMOL/L — HIGH (ref 96–108)
CHLORIDE SERPL-SCNC: 106 MMOL/L — SIGNIFICANT CHANGE UP (ref 98–107)
CO2 SERPL-SCNC: 19 MMOL/L — LOW (ref 22–31)
CREAT SERPL-MCNC: 0.93 MG/DL — SIGNIFICANT CHANGE UP (ref 0.5–1.3)
EOSINOPHIL # BLD AUTO: 0.04 K/UL — SIGNIFICANT CHANGE UP (ref 0–0.5)
EOSINOPHIL NFR BLD AUTO: 0.5 % — SIGNIFICANT CHANGE UP (ref 0–6)
FLUAV H1 2009 PAND RNA SPEC QL NAA+PROBE: NOT DETECTED — SIGNIFICANT CHANGE UP
FLUAV H1 RNA SPEC QL NAA+PROBE: NOT DETECTED — SIGNIFICANT CHANGE UP
FLUAV H3 RNA SPEC QL NAA+PROBE: DETECTED — HIGH
FLUBV RNA SPEC QL NAA+PROBE: NOT DETECTED — SIGNIFICANT CHANGE UP
GAS PNL BLDV: 137 MMOL/L — SIGNIFICANT CHANGE UP (ref 136–146)
GAS PNL BLDV: 139 MMOL/L — SIGNIFICANT CHANGE UP (ref 136–146)
GLUCOSE BLDV-MCNC: 112 MG/DL — HIGH (ref 70–99)
GLUCOSE BLDV-MCNC: 143 MG/DL — HIGH (ref 70–99)
GLUCOSE SERPL-MCNC: 145 MG/DL — HIGH (ref 70–99)
HADV DNA SPEC QL NAA+PROBE: NOT DETECTED — SIGNIFICANT CHANGE UP
HCO3 BLDV-SCNC: 18 MMOL/L — LOW (ref 20–27)
HCO3 BLDV-SCNC: 19 MMOL/L — LOW (ref 20–27)
HCOV PNL SPEC NAA+PROBE: SIGNIFICANT CHANGE UP
HCT VFR BLD CALC: 48.3 % — HIGH (ref 34.5–45)
HCT VFR BLDV CALC: 37.8 % — SIGNIFICANT CHANGE UP (ref 34.5–45)
HCT VFR BLDV CALC: 50.6 % — HIGH (ref 34.5–45)
HGB BLD-MCNC: 15.3 G/DL — SIGNIFICANT CHANGE UP (ref 11.5–15.5)
HGB BLDV-MCNC: 12.3 G/DL — SIGNIFICANT CHANGE UP (ref 11.5–15.5)
HGB BLDV-MCNC: 16.5 G/DL — HIGH (ref 11.5–15.5)
HMPV RNA SPEC QL NAA+PROBE: NOT DETECTED — SIGNIFICANT CHANGE UP
HPIV1 RNA SPEC QL NAA+PROBE: NOT DETECTED — SIGNIFICANT CHANGE UP
HPIV2 RNA SPEC QL NAA+PROBE: NOT DETECTED — SIGNIFICANT CHANGE UP
HPIV3 RNA SPEC QL NAA+PROBE: NOT DETECTED — SIGNIFICANT CHANGE UP
HPIV4 RNA SPEC QL NAA+PROBE: NOT DETECTED — SIGNIFICANT CHANGE UP
IMM GRANULOCYTES NFR BLD AUTO: 0.3 % — SIGNIFICANT CHANGE UP (ref 0–1.5)
INR BLD: 1.07 — SIGNIFICANT CHANGE UP (ref 0.88–1.17)
LACTATE BLDV-MCNC: 1.1 MMOL/L — SIGNIFICANT CHANGE UP (ref 0.5–2)
LACTATE BLDV-MCNC: 2 MMOL/L — SIGNIFICANT CHANGE UP (ref 0.5–2)
LIDOCAIN IGE QN: 27.7 U/L — SIGNIFICANT CHANGE UP (ref 7–60)
LYMPHOCYTES # BLD AUTO: 1.99 K/UL — SIGNIFICANT CHANGE UP (ref 1–3.3)
LYMPHOCYTES # BLD AUTO: 25.7 % — SIGNIFICANT CHANGE UP (ref 13–44)
MCHC RBC-ENTMCNC: 28.1 PG — SIGNIFICANT CHANGE UP (ref 27–34)
MCHC RBC-ENTMCNC: 31.7 % — LOW (ref 32–36)
MCV RBC AUTO: 88.8 FL — SIGNIFICANT CHANGE UP (ref 80–100)
MONOCYTES # BLD AUTO: 0.68 K/UL — SIGNIFICANT CHANGE UP (ref 0–0.9)
MONOCYTES NFR BLD AUTO: 8.8 % — SIGNIFICANT CHANGE UP (ref 2–14)
NEUTROPHILS # BLD AUTO: 4.95 K/UL — SIGNIFICANT CHANGE UP (ref 1.8–7.4)
NEUTROPHILS NFR BLD AUTO: 64.1 % — SIGNIFICANT CHANGE UP (ref 43–77)
NRBC # FLD: 0 K/UL — SIGNIFICANT CHANGE UP (ref 0–0)
PCO2 BLDV: 39 MMHG — LOW (ref 41–51)
PCO2 BLDV: 72 MMHG — HIGH (ref 41–51)
PH BLDV: 7.18 PH — CRITICAL LOW (ref 7.32–7.43)
PH BLDV: 7.31 PH — LOW (ref 7.32–7.43)
PLATELET # BLD AUTO: 186 K/UL — SIGNIFICANT CHANGE UP (ref 150–400)
PMV BLD: 9.8 FL — SIGNIFICANT CHANGE UP (ref 7–13)
PO2 BLDV: 24 MMHG — LOW (ref 35–40)
PO2 BLDV: 29 MMHG — LOW (ref 35–40)
POTASSIUM BLDV-SCNC: 3.2 MMOL/L — LOW (ref 3.4–4.5)
POTASSIUM BLDV-SCNC: 3.9 MMOL/L — SIGNIFICANT CHANGE UP (ref 3.4–4.5)
POTASSIUM SERPL-MCNC: 4.4 MMOL/L — SIGNIFICANT CHANGE UP (ref 3.5–5.3)
POTASSIUM SERPL-SCNC: 4.4 MMOL/L — SIGNIFICANT CHANGE UP (ref 3.5–5.3)
PROT SERPL-MCNC: 7.4 G/DL — SIGNIFICANT CHANGE UP (ref 6–8.3)
PROTHROM AB SERPL-ACNC: 12.2 SEC — SIGNIFICANT CHANGE UP (ref 9.8–13.1)
RBC # BLD: 5.44 M/UL — HIGH (ref 3.8–5.2)
RBC # FLD: 14.1 % — SIGNIFICANT CHANGE UP (ref 10.3–14.5)
RSV RNA SPEC QL NAA+PROBE: NOT DETECTED — SIGNIFICANT CHANGE UP
RV+EV RNA SPEC QL NAA+PROBE: NOT DETECTED — SIGNIFICANT CHANGE UP
SAO2 % BLDV: 34.2 % — LOW (ref 60–85)
SAO2 % BLDV: 39.1 % — LOW (ref 60–85)
SODIUM SERPL-SCNC: 138 MMOL/L — SIGNIFICANT CHANGE UP (ref 135–145)
TROPONIN T, HIGH SENSITIVITY: 6 NG/L — SIGNIFICANT CHANGE UP (ref ?–14)
WBC # BLD: 7.73 K/UL — SIGNIFICANT CHANGE UP (ref 3.8–10.5)
WBC # FLD AUTO: 7.73 K/UL — SIGNIFICANT CHANGE UP (ref 3.8–10.5)

## 2020-02-29 PROCEDURE — 71045 X-RAY EXAM CHEST 1 VIEW: CPT | Mod: 26

## 2020-02-29 PROCEDURE — 99223 1ST HOSP IP/OBS HIGH 75: CPT | Mod: GC

## 2020-02-29 RX ORDER — IBUPROFEN 200 MG
1 TABLET ORAL
Qty: 0 | Refills: 0 | DISCHARGE

## 2020-02-29 RX ORDER — ASPIRIN/CALCIUM CARB/MAGNESIUM 324 MG
81 TABLET ORAL DAILY
Refills: 0 | Status: DISCONTINUED | OUTPATIENT
Start: 2020-02-29 | End: 2020-02-29

## 2020-02-29 RX ORDER — SODIUM CHLORIDE 9 MG/ML
1000 INJECTION INTRAMUSCULAR; INTRAVENOUS; SUBCUTANEOUS ONCE
Refills: 0 | Status: COMPLETED | OUTPATIENT
Start: 2020-02-29 | End: 2020-02-29

## 2020-02-29 RX ORDER — IPRATROPIUM/ALBUTEROL SULFATE 18-103MCG
3 AEROSOL WITH ADAPTER (GRAM) INHALATION ONCE
Refills: 0 | Status: COMPLETED | OUTPATIENT
Start: 2020-02-29 | End: 2020-02-29

## 2020-02-29 RX ORDER — IPRATROPIUM/ALBUTEROL SULFATE 18-103MCG
3 AEROSOL WITH ADAPTER (GRAM) INHALATION EVERY 6 HOURS
Refills: 0 | Status: DISCONTINUED | OUTPATIENT
Start: 2020-02-29 | End: 2020-03-02

## 2020-02-29 RX ORDER — HEPARIN SODIUM 5000 [USP'U]/ML
5000 INJECTION INTRAVENOUS; SUBCUTANEOUS EVERY 12 HOURS
Refills: 0 | Status: DISCONTINUED | OUTPATIENT
Start: 2020-02-29 | End: 2020-03-02

## 2020-02-29 RX ORDER — ACETAMINOPHEN 500 MG
975 TABLET ORAL ONCE
Refills: 0 | Status: COMPLETED | OUTPATIENT
Start: 2020-02-29 | End: 2020-02-29

## 2020-02-29 RX ORDER — MAGNESIUM SULFATE 500 MG/ML
2 VIAL (ML) INJECTION ONCE
Refills: 0 | Status: COMPLETED | OUTPATIENT
Start: 2020-02-29 | End: 2020-02-29

## 2020-02-29 RX ORDER — POTASSIUM CHLORIDE 20 MEQ
40 PACKET (EA) ORAL ONCE
Refills: 0 | Status: COMPLETED | OUTPATIENT
Start: 2020-02-29 | End: 2020-03-01

## 2020-02-29 RX ORDER — FAMOTIDINE 10 MG/ML
40 INJECTION INTRAVENOUS DAILY
Refills: 0 | Status: DISCONTINUED | OUTPATIENT
Start: 2020-02-29 | End: 2020-03-02

## 2020-02-29 RX ORDER — LISINOPRIL 2.5 MG/1
1 TABLET ORAL
Qty: 0 | Refills: 0 | DISCHARGE

## 2020-02-29 RX ORDER — BUDESONIDE AND FORMOTEROL FUMARATE DIHYDRATE 160; 4.5 UG/1; UG/1
2 AEROSOL RESPIRATORY (INHALATION)
Refills: 0 | Status: DISCONTINUED | OUTPATIENT
Start: 2020-02-29 | End: 2020-03-02

## 2020-02-29 RX ORDER — ACETAMINOPHEN 500 MG
650 TABLET ORAL EVERY 6 HOURS
Refills: 0 | Status: DISCONTINUED | OUTPATIENT
Start: 2020-02-29 | End: 2020-03-02

## 2020-02-29 RX ORDER — ASPIRIN/CALCIUM CARB/MAGNESIUM 324 MG
81 TABLET ORAL DAILY
Refills: 0 | Status: DISCONTINUED | OUTPATIENT
Start: 2020-02-29 | End: 2020-03-02

## 2020-02-29 RX ORDER — ONDANSETRON 8 MG/1
4 TABLET, FILM COATED ORAL ONCE
Refills: 0 | Status: COMPLETED | OUTPATIENT
Start: 2020-02-29 | End: 2020-02-29

## 2020-02-29 RX ORDER — ATORVASTATIN CALCIUM 80 MG/1
40 TABLET, FILM COATED ORAL AT BEDTIME
Refills: 0 | Status: DISCONTINUED | OUTPATIENT
Start: 2020-02-29 | End: 2020-03-02

## 2020-02-29 RX ORDER — DEXAMETHASONE 0.5 MG/5ML
10 ELIXIR ORAL ONCE
Refills: 0 | Status: COMPLETED | OUTPATIENT
Start: 2020-02-29 | End: 2020-02-29

## 2020-02-29 RX ORDER — CYCLOBENZAPRINE HYDROCHLORIDE 10 MG/1
1 TABLET, FILM COATED ORAL
Qty: 0 | Refills: 0 | DISCHARGE

## 2020-02-29 RX ORDER — LANOLIN ALCOHOL/MO/W.PET/CERES
6 CREAM (GRAM) TOPICAL AT BEDTIME
Refills: 0 | Status: DISCONTINUED | OUTPATIENT
Start: 2020-02-29 | End: 2020-03-02

## 2020-02-29 RX ADMIN — Medication 100 MILLIGRAM(S): at 23:25

## 2020-02-29 RX ADMIN — Medication 50 GRAM(S): at 18:05

## 2020-02-29 RX ADMIN — Medication 102 MILLIGRAM(S): at 18:06

## 2020-02-29 RX ADMIN — ONDANSETRON 4 MILLIGRAM(S): 8 TABLET, FILM COATED ORAL at 18:17

## 2020-02-29 RX ADMIN — Medication 3 MILLILITER(S): at 21:16

## 2020-02-29 RX ADMIN — SODIUM CHLORIDE 1000 MILLILITER(S): 9 INJECTION INTRAMUSCULAR; INTRAVENOUS; SUBCUTANEOUS at 18:51

## 2020-02-29 RX ADMIN — SODIUM CHLORIDE 1000 MILLILITER(S): 9 INJECTION INTRAMUSCULAR; INTRAVENOUS; SUBCUTANEOUS at 18:43

## 2020-02-29 RX ADMIN — Medication 3 MILLILITER(S): at 18:04

## 2020-02-29 RX ADMIN — Medication 75 MILLIGRAM(S): at 23:25

## 2020-02-29 RX ADMIN — Medication 6 MILLIGRAM(S): at 23:25

## 2020-02-29 RX ADMIN — Medication 975 MILLIGRAM(S): at 18:57

## 2020-02-29 NOTE — ED PROVIDER NOTE - PROGRESS NOTE DETAILS
Dona Ramírez MD: Pt not in respiratory distress after 1x duoneb, Mg, dexamethasone. Lungs with mild wheezing, but much improved. Febrile in the setting of N/V/D, possibly asthma exacerbation in setting of gastroenteritis. Chica Plasencia MD PGY-2 pt signed out to me, admitted with Dr. Dona Ramírez to the hospitalist

## 2020-02-29 NOTE — ED PROVIDER NOTE - CRITICAL CARE PROVIDED
direct patient care (not related to procedure)/documentation/additional history taking/I have personally provided the amount of critical care time documented below, excluding time spent on separate procedures. I spoke with several family member(s). Full-code./interpretation of diagnostic studies/consultation with other physicians/conducted a detailed discussion of DNR status

## 2020-02-29 NOTE — H&P ADULT - PROBLEM SELECTOR PLAN 9
Transitions of Care Status:  1.  Name of PCP: Dr. Thomas   2.  PCP Contacted on Admission: [ ] Y    [x ] N  - night admission  3.  PCP contacted at Discharge: [ ] Y    [ ] N    [ ] N/A  4.  Post-Discharge Appointment Date and Location:  5.  Summary of Handoff given to PCP:

## 2020-02-29 NOTE — ED PROVIDER NOTE - CLINICAL SUMMARY MEDICAL DECISION MAKING FREE TEXT BOX
Ciara: Asthma exacerbation, perhaps triggered by viral AGE vs. influenza. Treated aggressively w/ nebs, steroids, Mag w/ improvement. Check for flu and PNA. Labs. IVF. Admit.

## 2020-02-29 NOTE — H&P ADULT - PROBLEM SELECTOR PLAN 1
flu + on RVP. Sx onset within 48 hrs.   - will order tamiflu 5 day course.  - treat for asthma exacerbation as below.  - treat possible PNA as below. flu + on RVP. Sx onset within 48 hrs.   - will order tamiflu 5 day course.  - treat for asthma exacerbation as below.  - treat possible PNA as below.  - droplet precaution  - cough has stopped, but if needed will give her antitussive PRN. flu + on RVP. Sx onset within 48 hrs.   - will order tamiflu 5 day course.  - treat for asthma exacerbation as below.  - possible PNA as below.  - droplet precaution  - cough has stopped, but if needed will give her antitussive PRN.

## 2020-02-29 NOTE — H&P ADULT - NSICDXFAMILYHX_GEN_ALL_CORE_FT
FAMILY HISTORY:  FH: CHF (congestive heart failure)  FH: COPD (chronic obstructive pulmonary disease)

## 2020-02-29 NOTE — ED ADULT TRIAGE NOTE - CHIEF COMPLAINT QUOTE
Notification PT SOB x several hours as per  Pt agitated, pale and uncooperative, arrived in ED without O2 on pt having difficulty breathing unable to speak POX 69% on R/A   PMH: Asthma

## 2020-02-29 NOTE — H&P ADULT - ATTENDING COMMENTS
63 y/o female , + smoker, Asthma, HTN, GERD, HX of Thoracic/cervical Radiculopathy, due to Disc Herniation, Never intubated, pt is admitted for Hypoxia, + Fever, + Dry Cough, + wheezing, + SOB, + Chest pain Producible, Abdominal discomfort, + soft Stool, + N/V x 1, T .3, Low Blood pressure 98/64, , RR 17, Oxygenation improved to 96% with treatment, high LFTs, + RVP influenza AH1, K+ 3.2 was supplemented, S/P IVF NS x 3 Lit., IV Decadron 10 mg x 1, IV mg 2 gm x 1, Duoneb x 3, + Sepsis, + asthma Exacerbation, R/O superimposed Pneumonia,     Started Tamiflu 75 mg PO BID x 5 days from [facility], Pulmonary consult in AM, STOP SMOKING, DVT Prophylaxis: SQ Heparin, F/U Cultures, Duoneb Q 6 HR, Prednisone 40 mg QD x 4 days, Pepcid, Fall/aspiration precaution, Droplets precaution, PT consult, on ASA, Symbicort, CT Chest no contrast,   Lipitor, F/U CBC, CMP, HgbA1c, TSH, Free T4, UA, Urine Legionella, Fasting Lipid, Iron Studies, Ferritin, Vit B12, Folate, PT, PTT, INR, Benzonate 100 mg po TID x 3 days , Hold BP Med, STOP Smoking,    Case D/W, Pt, HS, Nursing,    Pt was seen by me, Dr. ONRA Teixeira on 2/29/2020.

## 2020-02-29 NOTE — ED ADULT NURSE REASSESSMENT NOTE - NS ED NURSE REASSESS COMMENT FT1
Patient Flu positive. Placed on droplet precautions. Education on droplet precaution given to patient and family. Patient oxygen saturation 98% on room air. Respirations unlabored. Report given to ESSU 1 RN.

## 2020-02-29 NOTE — H&P ADULT - PROBLEM SELECTOR PLAN 3
Asthma exacerbation in setting of flu and possible PNA.  - Acute respiratory acidosis with VBG showing pH of 7.18 and pCO2 72. Improved after initial bronchodilator and steroid treatment.  - will continue with home symbicort  - Duoneb ATC   - currently breathing well on RA satting 96%.  - monitor O2. Asthma exacerbation in setting of flu and possible PNA. She is current every day smoker.  - Acute respiratory acidosis with VBG showing pH of 7.18 and pCO2 72. Improved after initial bronchodilator and steroid treatment.  - will continue with home symbicort  - Duoneb ATC   - currently breathing well on RA satting 96%.  - monitor O2.  - discussed smoking cessation. Offered NRT, yet patient deferred. Asthma exacerbation in setting of flu and possible PNA. She is current every day smoker.  - Acute respiratory acidosis with VBG showing pH of 7.18 and pCO2 72. Improved after initial bronchodilator and steroid treatment.  - will continue with home symbicort  - Duoneb ATC  - ordered short course of Prednisone 40mg daily.  - currently breathing well on RA satting 96%.  - monitor O2.  - discussed smoking cessation. Offered NRT, yet patient deferred. Asthma exacerbation in setting of flu and possible PNA. She is current every day smoker.  - Acute respiratory acidosis with VBG showing pH of 7.18 and pCO2 72. Improved after initial bronchodilator and steroid treatment.  - will continue with home symbicort  - Duoneb ATC  - ordered short course of Prednisone 40mg daily.  - currently breathing well on RA satting 96%.  - monitor O2.  - discussed smoking cessation. Offered NRT, yet patient deferred.  - consider pulm consult in AM

## 2020-02-29 NOTE — H&P ADULT - ASSESSMENT
Mrs. Pratt is a 63 y/o female w/ Asthma, GERD, Cervical/Thoracic radiculopathy with herniated disc, and current smoker, who is admitted for sepsis and acute asthma exacerbation 2/2 flu.

## 2020-02-29 NOTE — ED PROVIDER NOTE - PHYSICAL EXAMINATION
Acutely ill-appearing, well nourished, awake, alert, oriented to person, place, time/situation and in respiratory distress.    Airway patent. No stridor.    Eyes without scleral injection. No jaundice.    Strong pulse.    Respirations labored. Diffuse wheezing.    Abdomen soft, non-tender, no guarding.    Spine appears normal, range of motion is not limited, no muscle or joint tenderness    Alert and oriented, no gross motor or sensory deficits.    Skin normal color for race, warm, dry and intact. No evidence of rash.    No SI/HI.

## 2020-02-29 NOTE — H&P ADULT - NSHPLABSRESULTS_GEN_ALL_CORE
15.3   7.73  )-----------( 186      ( 29 Feb 2020 18:08 )             48.3       02-29    138  |  106  |  10  ----------------------------<  145<H>  4.4   |  19<L>  |  0.93    Ca    9.8      29 Feb 2020 18:08    TPro  7.4  /  Alb  4.4  /  TBili  0.4  /  DBili  x   /  AST  58<H>  /  ALT  90<H>  /  AlkPhos  88  02-29                  PT/INR - ( 29 Feb 2020 18:08 )   PT: 12.2 SEC;   INR: 1.07          PTT - ( 29 Feb 2020 18:08 )  PTT:29.6 SEC    Lactate Trend            CAPILLARY BLOOD GLUCOSE                RADIOLOGY, EKG & ADDITIONAL TESTS: Reviewed.   CXR: pending  EKG: missing in ED, asked nurse to acquire repeat EKG

## 2020-02-29 NOTE — H&P ADULT - NSHPSOCIALHISTORY_GEN_ALL_CORE
Lives with   ambulates without assistance device, independent for ADL/IADL at baseline.  Current smoker: chronic smoking. 1/2 ppd.   No EtOH or rec drug use.

## 2020-02-29 NOTE — ED PROVIDER NOTE - OBJECTIVE STATEMENT
Ciara: H/o asthma, cervical and thoracic radiculopathy, p/w 2 days N/V (no blood), loose stools, crampy abd pain, wheezing, cough. Had restaurant Chinese food 1 day prior to Sx. BIB EMS wheezing; got nebs en-route. Upon arrival, respiratory distress. Got Mag and steroids. Got 1 Duo-neb. Failed home nebs. Markedly-improved after these interventions.

## 2020-02-29 NOTE — ED PROVIDER NOTE - ATTENDING CONTRIBUTION TO CARE
I performed a face-to-face evaluation of the patient and performed a history and physical examination. I agree with the history and physical examination.    Ciara: Asthma exacerbation, perhaps triggered by viral AGE vs. influenza. Treated aggressively w/ nebs, steroids, Mag w/ improvement. Check for flu and PNA. Labs. IVF. Admit.

## 2020-02-29 NOTE — ED ADULT NURSE NOTE - OBJECTIVE STATEMENT
pt directly to rm in resp distress. 100 percent nrb in place  18 g placed in rt ac . labs sent off/   meds given as ordered,

## 2020-02-29 NOTE — H&P ADULT - NSHPREVIEWOFSYSTEMS_GEN_ALL_CORE
CONSTITUTIONAL: fever, fatigue.  EYES: No eye pain, visual disturbances, or discharge  ENMT: dry lips and throat pain from coughing.  No difficulty hearing, tinnitus, vertigo; No sinus pain.  RESPIRATORY: dry cough, wheezing, SOB at rest. no chills or hemoptysis;  CARDIOVASCULAR: No chest pain, palpitations, dizziness, or leg swelling  GASTROINTESTINAL: abdominal discomfort. No nausea, vomiting, or hematemesis; No diarrhea or constipation. No melena or hematochezia.  GENITOURINARY: No dysuria, frequency, hematuria, or incontinence  NEUROLOGICAL: No headaches, loss of strength, numbness, or tremors  SKIN: No itching, burning, rashes, or lesions   LYMPH NODES: No enlarged glands  ENDOCRINE: No heat or cold intolerance; No polydipsia or polyuria  HEME/LYMPH: No easy bruising, or bleeding gums  ALLERGY AND IMMUNOLOGIC: No hives or eczema

## 2020-02-29 NOTE — H&P ADULT - PROBLEM SELECTOR PLAN 8
- DVT ppx: IMPROVE score 0. early mobilization  - Diet: DASH diet. - DVT ppx: SQH  - Diet: DASH diet.

## 2020-02-29 NOTE — H&P ADULT - PROBLEM SELECTOR PLAN 5
initially hypertensive in setting of dyspnea and anxiety. now back down to normotensive.  - holding home lisinopril in setting of sepsis.

## 2020-02-29 NOTE — H&P ADULT - PROBLEM SELECTOR PLAN 2
CXR with right lower lobe opacity concerning for PNA, viral (flu) vs. bacterial.  - f/u BCx  - CTX + zithromax   - ordered urine legionella.  - narrow ABx as appropriate.  - Tamiflu as above CXR with right lower lobe opacity concerning for PNA, viral (flu) vs. bacterial.  - f/u BCx  - hold off ABx as her symptoms c/w influenza infection.   - CT non con chest.  - Tamiflu as above

## 2020-02-29 NOTE — H&P ADULT - PROBLEM SELECTOR PLAN 4
Tachycardia, Fever, and tachypnea. RVP + for flu and CXR with possibile PNA.  - treat flu and PNA as above.  - f/u infectious work up as above + U/A.  - s/p 2L IVF in ED. Patient with limited PO intake. will give her gentle mIVF overnight and monitor her PO intake.  - monitor VS. Tachycardia, Fever, and tachypnea. RVP + for flu and CXR with possibile PNA.  - treat flu and PNA as above.  - f/u infectious work up as above + U/A.  - s/p 2L IVF in ED. Patient with limited PO intake.   - monitor VS.

## 2020-02-29 NOTE — H&P ADULT - NSHPPHYSICALEXAM_GEN_ALL_CORE
Vital Signs Last 24 Hrs  T(C): 37.2 (29 Feb 2020 20:10), Max: 38.5 (29 Feb 2020 18:41)  T(F): 98.9 (29 Feb 2020 20:10), Max: 101.3 (29 Feb 2020 18:41)  HR: 106 (29 Feb 2020 20:10) (101 - 120)  BP: 104/67 (29 Feb 2020 20:10) (104/67 - 230/118)  BP(mean): --  RR: 20 (29 Feb 2020 20:10) (20 - 24)  SpO2: 98% (29 Feb 2020 20:10) (69% - 100%)      PHYSICAL EXAM:    Constitutional: NAD. sitting on bed on room air.  HEENT: AT/NC, PERRLA; EOMI, dry mucous membrane, supple neck.  Respiratory: diffuse wheezing with prolonged expiration phase. equal aeration bilaterally. no nasal flaring, retractions or perioral cyanosis.  Cardiovascular: distant heart sounds, RRR, no M/R/G. 2+ distal pulses. Cap refill ~3 seconds.   Gastrointestinal: soft; NT/ND, +BS  Extremities: no cyanosis; no pedal edema, non-tender to palpation, DP and Radial pulses intact.  Skin: warm and dry; color normal: no rash: no ulcers  Neurological: A&Ox 3; responds to pain; responds to verbal commands; epicritic and protopathic sensation intact: CN nerves grossly intact.   MSK/Back: no deformities. Active and passive ROM intact; strength intact, no CVA tenderness, No joint tenderness, swelling, erythema  Psychiatric: anxious. constantly checking her vitals monitor and asking if her breathing is okay.

## 2020-02-29 NOTE — H&P ADULT - HISTORY OF PRESENT ILLNESS
Mrs. Pratt is a 63 y/o female w/ Asthma, GERD, Cervical/Thoracic radiculopathy with herniated disc, and current smoker, who presented to the hospital with acute asthma exacerbation.    Patient was at her usual state of health on Thursday, visited her Neurology clinic and went to chiropracter for her radiculopathy. That night, she had chinese food with her  at a restaurant (who is Asymptomatic) and went to sleep. The next day, she woke up with some abdominal discomfort, that felt "viral gassy." It was not pain, but more of discomfort. She had 2 BM w/ soft stool (not watery), which improved her abdominal discomfort. She denies nausea but had one episode of NBNB emesis yesterday when she was taking robitussin, as starting yesterday, she had increase wheezing, worsening of dry coughs, and SOB at rest. She is current smoker, and smokes about 1/2 ppd. She tried cough syrups/candies, her home symbicort and albuterol inhaler x4, which did not help. Her SOB was getting worse, and she called EMS to come to the hospital for asthma attack. En route, she received a nebulizer. She has some muscular chest pain, worse with coughing. She denies any palpitation, headache, lightheadedness, visual disturbance, gait disturbance, paresthesia/numbness. No BM today. no dysuria/hematuria. no melena/hematochezia.    For her asthma, Her last hospitalization for asthma was about 5 yrs ago. Never required intubation or ICU stay. She uses symbicort BID and albuterol inhaler PRN (about TID usually). She used to see pulm, but not anymore and her PCP (Dr. Thomas) is controlling the medications.    In ED, she had tmax of 101.3, , /118 down to 124/76, RR 22, SpO2 96% on RA.  She received 2L NS, Tylenol, Duoneb x3, Decadron 10mg, Mg, and Zofran.

## 2020-03-01 LAB
ALBUMIN SERPL ELPH-MCNC: 3.9 G/DL — SIGNIFICANT CHANGE UP (ref 3.3–5)
ALP SERPL-CCNC: 84 U/L — SIGNIFICANT CHANGE UP (ref 40–120)
ALT FLD-CCNC: 103 U/L — HIGH (ref 4–33)
ANION GAP SERPL CALC-SCNC: 9 MMO/L — SIGNIFICANT CHANGE UP (ref 7–14)
ANION GAP SERPL CALC-SCNC: 9 MMO/L — SIGNIFICANT CHANGE UP (ref 7–14)
ANISOCYTOSIS BLD QL: SLIGHT — SIGNIFICANT CHANGE UP
APPEARANCE UR: CLEAR — SIGNIFICANT CHANGE UP
AST SERPL-CCNC: 65 U/L — HIGH (ref 4–32)
BASE EXCESS BLDV CALC-SCNC: -5 MMOL/L — SIGNIFICANT CHANGE UP
BASOPHILS # BLD AUTO: 0.01 K/UL — SIGNIFICANT CHANGE UP (ref 0–0.2)
BASOPHILS NFR BLD AUTO: 0.2 % — SIGNIFICANT CHANGE UP (ref 0–2)
BASOPHILS NFR SPEC: 0 % — SIGNIFICANT CHANGE UP (ref 0–2)
BILIRUB SERPL-MCNC: 0.3 MG/DL — SIGNIFICANT CHANGE UP (ref 0.2–1.2)
BILIRUB UR-MCNC: NEGATIVE — SIGNIFICANT CHANGE UP
BLASTS # FLD: 0 % — SIGNIFICANT CHANGE UP (ref 0–0)
BLOOD GAS VENOUS - CREATININE: 0.81 MG/DL — SIGNIFICANT CHANGE UP (ref 0.5–1.3)
BLOOD UR QL VISUAL: NEGATIVE — SIGNIFICANT CHANGE UP
BUN SERPL-MCNC: 9 MG/DL — SIGNIFICANT CHANGE UP (ref 7–23)
BUN SERPL-MCNC: 9 MG/DL — SIGNIFICANT CHANGE UP (ref 7–23)
CALCIUM SERPL-MCNC: 9 MG/DL — SIGNIFICANT CHANGE UP (ref 8.4–10.5)
CALCIUM SERPL-MCNC: 9 MG/DL — SIGNIFICANT CHANGE UP (ref 8.4–10.5)
CHLORIDE BLDV-SCNC: 116 MMOL/L — HIGH (ref 96–108)
CHLORIDE SERPL-SCNC: 112 MMOL/L — HIGH (ref 98–107)
CHLORIDE SERPL-SCNC: 112 MMOL/L — HIGH (ref 98–107)
CHOLEST SERPL-MCNC: 155 MG/DL — SIGNIFICANT CHANGE UP (ref 120–199)
CO2 SERPL-SCNC: 19 MMOL/L — LOW (ref 22–31)
CO2 SERPL-SCNC: 19 MMOL/L — LOW (ref 22–31)
COLOR SPEC: SIGNIFICANT CHANGE UP
CREAT SERPL-MCNC: 0.71 MG/DL — SIGNIFICANT CHANGE UP (ref 0.5–1.3)
CREAT SERPL-MCNC: 0.71 MG/DL — SIGNIFICANT CHANGE UP (ref 0.5–1.3)
EOSINOPHIL # BLD AUTO: 0 K/UL — SIGNIFICANT CHANGE UP (ref 0–0.5)
EOSINOPHIL NFR BLD AUTO: 0 % — SIGNIFICANT CHANGE UP (ref 0–6)
EOSINOPHIL NFR FLD: 0 % — SIGNIFICANT CHANGE UP (ref 0–6)
FERRITIN SERPL-MCNC: 115.8 NG/ML — SIGNIFICANT CHANGE UP (ref 15–150)
FOLATE SERPL-MCNC: 7.7 NG/ML — SIGNIFICANT CHANGE UP (ref 4.7–20)
GAS PNL BLDV: 140 MMOL/L — SIGNIFICANT CHANGE UP (ref 136–146)
GLUCOSE BLDV-MCNC: 121 MG/DL — HIGH (ref 70–99)
GLUCOSE SERPL-MCNC: 122 MG/DL — HIGH (ref 70–99)
GLUCOSE SERPL-MCNC: 122 MG/DL — HIGH (ref 70–99)
GLUCOSE UR-MCNC: NEGATIVE — SIGNIFICANT CHANGE UP
HAV IGM SER-ACNC: NONREACTIVE — SIGNIFICANT CHANGE UP
HBA1C BLD-MCNC: 5.3 % — SIGNIFICANT CHANGE UP (ref 4–5.6)
HBV CORE IGM SER-ACNC: NONREACTIVE — SIGNIFICANT CHANGE UP
HBV SURFACE AG SER-ACNC: NONREACTIVE — SIGNIFICANT CHANGE UP
HCO3 BLDV-SCNC: 19 MMOL/L — LOW (ref 20–27)
HCT VFR BLD CALC: 44.1 % — SIGNIFICANT CHANGE UP (ref 34.5–45)
HCT VFR BLDV CALC: 44.2 % — SIGNIFICANT CHANGE UP (ref 34.5–45)
HCV AB S/CO SERPL IA: 0.13 S/CO — SIGNIFICANT CHANGE UP (ref 0–0.99)
HCV AB SERPL-IMP: SIGNIFICANT CHANGE UP
HDLC SERPL-MCNC: 71 MG/DL — HIGH (ref 45–65)
HGB BLD-MCNC: 13.7 G/DL — SIGNIFICANT CHANGE UP (ref 11.5–15.5)
HGB BLDV-MCNC: 14.4 G/DL — SIGNIFICANT CHANGE UP (ref 11.5–15.5)
IMM GRANULOCYTES NFR BLD AUTO: 0.6 % — SIGNIFICANT CHANGE UP (ref 0–1.5)
IRON SATN MFR SERPL: 25 UG/DL — LOW (ref 30–160)
IRON SATN MFR SERPL: 259 UG/DL — SIGNIFICANT CHANGE UP (ref 140–530)
KETONES UR-MCNC: NEGATIVE — SIGNIFICANT CHANGE UP
LACTATE BLDV-MCNC: 1.7 MMOL/L — SIGNIFICANT CHANGE UP (ref 0.5–2)
LEUKOCYTE ESTERASE UR-ACNC: NEGATIVE — SIGNIFICANT CHANGE UP
LIPID PNL WITH DIRECT LDL SERPL: 81 MG/DL — SIGNIFICANT CHANGE UP
LYMPHOCYTES # BLD AUTO: 0.49 K/UL — LOW (ref 1–3.3)
LYMPHOCYTES # BLD AUTO: 9.9 % — LOW (ref 13–44)
LYMPHOCYTES NFR SPEC AUTO: 3.5 % — LOW (ref 13–44)
MACROCYTES BLD QL: SLIGHT — SIGNIFICANT CHANGE UP
MAGNESIUM SERPL-MCNC: 2.1 MG/DL — SIGNIFICANT CHANGE UP (ref 1.6–2.6)
MAGNESIUM SERPL-MCNC: 2.1 MG/DL — SIGNIFICANT CHANGE UP (ref 1.6–2.6)
MCHC RBC-ENTMCNC: 28 PG — SIGNIFICANT CHANGE UP (ref 27–34)
MCHC RBC-ENTMCNC: 31.1 % — LOW (ref 32–36)
MCV RBC AUTO: 90.2 FL — SIGNIFICANT CHANGE UP (ref 80–100)
METAMYELOCYTES # FLD: 0 % — SIGNIFICANT CHANGE UP (ref 0–1)
MONOCYTES # BLD AUTO: 0.21 K/UL — SIGNIFICANT CHANGE UP (ref 0–0.9)
MONOCYTES NFR BLD AUTO: 4.2 % — SIGNIFICANT CHANGE UP (ref 2–14)
MONOCYTES NFR BLD: 1.7 % — LOW (ref 2–9)
MYELOCYTES NFR BLD: 0 % — SIGNIFICANT CHANGE UP (ref 0–0)
NEUTROPHIL AB SER-ACNC: 92.1 % — HIGH (ref 43–77)
NEUTROPHILS # BLD AUTO: 4.22 K/UL — SIGNIFICANT CHANGE UP (ref 1.8–7.4)
NEUTROPHILS NFR BLD AUTO: 85.1 % — HIGH (ref 43–77)
NEUTS BAND # BLD: 1.8 % — SIGNIFICANT CHANGE UP (ref 0–6)
NITRITE UR-MCNC: NEGATIVE — SIGNIFICANT CHANGE UP
NRBC # FLD: 0 K/UL — SIGNIFICANT CHANGE UP (ref 0–0)
OTHER - HEMATOLOGY %: 0 — SIGNIFICANT CHANGE UP
PCO2 BLDV: 42 MMHG — SIGNIFICANT CHANGE UP (ref 41–51)
PH BLDV: 7.31 PH — LOW (ref 7.32–7.43)
PH UR: 6 — SIGNIFICANT CHANGE UP (ref 5–8)
PHOSPHATE SERPL-MCNC: 1.6 MG/DL — LOW (ref 2.5–4.5)
PHOSPHATE SERPL-MCNC: 1.6 MG/DL — LOW (ref 2.5–4.5)
PLATELET # BLD AUTO: 137 K/UL — LOW (ref 150–400)
PLATELET COUNT - ESTIMATE: SIGNIFICANT CHANGE UP
PMV BLD: 10.4 FL — SIGNIFICANT CHANGE UP (ref 7–13)
PO2 BLDV: 30 MMHG — LOW (ref 35–40)
POLYCHROMASIA BLD QL SMEAR: SLIGHT — SIGNIFICANT CHANGE UP
POTASSIUM BLDV-SCNC: 4.5 MMOL/L — SIGNIFICANT CHANGE UP (ref 3.4–4.5)
POTASSIUM SERPL-MCNC: 4.7 MMOL/L — SIGNIFICANT CHANGE UP (ref 3.5–5.3)
POTASSIUM SERPL-MCNC: 4.7 MMOL/L — SIGNIFICANT CHANGE UP (ref 3.5–5.3)
POTASSIUM SERPL-SCNC: 4.7 MMOL/L — SIGNIFICANT CHANGE UP (ref 3.5–5.3)
POTASSIUM SERPL-SCNC: 4.7 MMOL/L — SIGNIFICANT CHANGE UP (ref 3.5–5.3)
PROMYELOCYTES # FLD: 0 % — SIGNIFICANT CHANGE UP (ref 0–0)
PROT SERPL-MCNC: 6.4 G/DL — SIGNIFICANT CHANGE UP (ref 6–8.3)
PROT UR-MCNC: NEGATIVE — SIGNIFICANT CHANGE UP
RBC # BLD: 4.89 M/UL — SIGNIFICANT CHANGE UP (ref 3.8–5.2)
RBC # FLD: 14.4 % — SIGNIFICANT CHANGE UP (ref 10.3–14.5)
SAO2 % BLDV: 51.3 % — LOW (ref 60–85)
SODIUM SERPL-SCNC: 140 MMOL/L — SIGNIFICANT CHANGE UP (ref 135–145)
SODIUM SERPL-SCNC: 140 MMOL/L — SIGNIFICANT CHANGE UP (ref 135–145)
SP GR SPEC: 1.01 — SIGNIFICANT CHANGE UP (ref 1–1.04)
SPECIMEN SOURCE: SIGNIFICANT CHANGE UP
SPECIMEN SOURCE: SIGNIFICANT CHANGE UP
T4 FREE SERPL-MCNC: 1.15 NG/DL — SIGNIFICANT CHANGE UP (ref 0.9–1.8)
TRIGL SERPL-MCNC: 36 MG/DL — SIGNIFICANT CHANGE UP (ref 10–149)
TSH SERPL-MCNC: 0.24 UIU/ML — LOW (ref 0.27–4.2)
UIBC SERPL-MCNC: 233.8 UG/DL — SIGNIFICANT CHANGE UP (ref 110–370)
UROBILINOGEN FLD QL: NORMAL — SIGNIFICANT CHANGE UP
VARIANT LYMPHS # BLD: 0.9 % — SIGNIFICANT CHANGE UP
VIT B12 SERPL-MCNC: 560 PG/ML — SIGNIFICANT CHANGE UP (ref 200–900)
WBC # BLD: 4.96 K/UL — SIGNIFICANT CHANGE UP (ref 3.8–10.5)
WBC # FLD AUTO: 4.96 K/UL — SIGNIFICANT CHANGE UP (ref 3.8–10.5)

## 2020-03-01 PROCEDURE — 99232 SBSQ HOSP IP/OBS MODERATE 35: CPT

## 2020-03-01 PROCEDURE — 71250 CT THORAX DX C-: CPT | Mod: 26

## 2020-03-01 RX ORDER — IBUPROFEN 200 MG
800 TABLET ORAL ONCE
Refills: 0 | Status: COMPLETED | OUTPATIENT
Start: 2020-03-01 | End: 2020-03-01

## 2020-03-01 RX ORDER — ALPRAZOLAM 0.25 MG
0.25 TABLET ORAL ONCE
Refills: 0 | Status: DISCONTINUED | OUTPATIENT
Start: 2020-03-01 | End: 2020-03-01

## 2020-03-01 RX ORDER — NICOTINE POLACRILEX 2 MG
1 GUM BUCCAL DAILY
Refills: 0 | Status: DISCONTINUED | OUTPATIENT
Start: 2020-03-01 | End: 2020-03-02

## 2020-03-01 RX ORDER — PANTOPRAZOLE SODIUM 20 MG/1
40 TABLET, DELAYED RELEASE ORAL ONCE
Refills: 0 | Status: COMPLETED | OUTPATIENT
Start: 2020-03-01 | End: 2020-03-01

## 2020-03-01 RX ORDER — INFLUENZA VIRUS VACCINE 15; 15; 15; 15 UG/.5ML; UG/.5ML; UG/.5ML; UG/.5ML
0.5 SUSPENSION INTRAMUSCULAR ONCE
Refills: 0 | Status: DISCONTINUED | OUTPATIENT
Start: 2020-03-01 | End: 2020-03-02

## 2020-03-01 RX ADMIN — Medication 3 MILLILITER(S): at 09:05

## 2020-03-01 RX ADMIN — ATORVASTATIN CALCIUM 40 MILLIGRAM(S): 80 TABLET, FILM COATED ORAL at 23:30

## 2020-03-01 RX ADMIN — Medication 6 MILLIGRAM(S): at 23:31

## 2020-03-01 RX ADMIN — Medication 100 MILLIGRAM(S): at 23:30

## 2020-03-01 RX ADMIN — SODIUM CHLORIDE 1000 MILLILITER(S): 9 INJECTION INTRAMUSCULAR; INTRAVENOUS; SUBCUTANEOUS at 00:58

## 2020-03-01 RX ADMIN — Medication 0.25 MILLIGRAM(S): at 13:26

## 2020-03-01 RX ADMIN — Medication 3 MILLILITER(S): at 19:20

## 2020-03-01 RX ADMIN — Medication 800 MILLIGRAM(S): at 02:21

## 2020-03-01 RX ADMIN — Medication 100 MILLIGRAM(S): at 13:26

## 2020-03-01 RX ADMIN — Medication 800 MILLIGRAM(S): at 02:51

## 2020-03-01 RX ADMIN — Medication 1 PATCH: at 23:29

## 2020-03-01 RX ADMIN — BUDESONIDE AND FORMOTEROL FUMARATE DIHYDRATE 2 PUFF(S): 160; 4.5 AEROSOL RESPIRATORY (INHALATION) at 09:05

## 2020-03-01 RX ADMIN — Medication 40 MILLIEQUIVALENT(S): at 00:57

## 2020-03-01 RX ADMIN — Medication 650 MILLIGRAM(S): at 23:30

## 2020-03-01 RX ADMIN — FAMOTIDINE 40 MILLIGRAM(S): 10 INJECTION INTRAVENOUS at 09:05

## 2020-03-01 RX ADMIN — Medication 75 MILLIGRAM(S): at 17:43

## 2020-03-01 RX ADMIN — Medication 81 MILLIGRAM(S): at 09:05

## 2020-03-01 RX ADMIN — Medication 40 MILLIGRAM(S): at 05:13

## 2020-03-01 RX ADMIN — Medication 75 MILLIGRAM(S): at 09:05

## 2020-03-01 RX ADMIN — Medication 100 MILLIGRAM(S): at 05:13

## 2020-03-01 RX ADMIN — Medication 3 MILLILITER(S): at 03:52

## 2020-03-01 RX ADMIN — BUDESONIDE AND FORMOTEROL FUMARATE DIHYDRATE 2 PUFF(S): 160; 4.5 AEROSOL RESPIRATORY (INHALATION) at 23:16

## 2020-03-01 NOTE — PROGRESS NOTE ADULT - PROBLEM SELECTOR PLAN 6
- c/w home flexeril.  - holding NSAIDs in setting of abdominal discomfort and initial HTN.  - patient states that she takes tramadol once in a while, yet iSTOP (reference #: [id number])  - f/u with outpatient neurologist  - fall precaution. PT consult in AM

## 2020-03-01 NOTE — PROGRESS NOTE ADULT - PROBLEM SELECTOR PLAN 2
Asthma exacerbation in setting of flu and possible PNA. She is current every day smoker.  - Acute respiratory acidosis with VBG showing pH of 7.18 and pCO2 72. Now improved  - will continue with home symbicort  - Duoneb ATC  - ordered short course of Prednisone 40mg daily.  - currently breathing well on RA satting 96%.  - monitor O2.  - discussed smoking cessation. Offered NRT, yet patient deferred.

## 2020-03-01 NOTE — PROGRESS NOTE ADULT - ASSESSMENT
Mrs. Pratt is a 65 y/o female w/ Asthma, GERD, Cervical/Thoracic radiculopathy with herniated disc, and current smoker, who is admitted for sepsis and acute asthma exacerbation 2/2 flu.

## 2020-03-01 NOTE — PHYSICAL THERAPY INITIAL EVALUATION ADULT - DISCHARGE DISPOSITION, PT EVAL
Patient will not be put on PT program as patient is at functional baseline/no skilled PT needs/home 01-Oct-2018

## 2020-03-01 NOTE — PROGRESS NOTE ADULT - PROBLEM SELECTOR PLAN 1
flu + on RVP. Sx onset within 48 hrs.   - will order tamiflu 5 day course.  - treat for asthma exacerbation as below.  - droplet precaution  - Still with cough, symptomatic treatment  -CT Chest with clear lungs, no pneumonia

## 2020-03-01 NOTE — PROGRESS NOTE ADULT - SUBJECTIVE AND OBJECTIVE BOX
Patient is a 64y old  Female who presents with a chief complaint of Asthma exacerbation 2/2 Flu (29 Feb 2020 20:37)    INTERVAL HPI/OVERNIGHT EVENTS: Admitted overnight. Patient currently reports feeling improved, but still with fevers and cough. She also reports that she breathes faster when she becomes anxious. She also endorses GI upset a few days ago as well as myalgias.    T(C): 36.5 (03-01-20 @ 09:00), Max: 38.5 (02-29-20 @ 18:41)  HR: 69 (03-01-20 @ 13:00) (69 - 120)  BP: 112/84 (03-01-20 @ 13:00) (98/64 - 230/118)  RR: 17 (03-01-20 @ 13:00) (17 - 24)  SpO2: 98% (03-01-20 @ 13:00) (69% - 100%)  Wt(kg): --  I&O's Summary      LABS:                        13.7   4.96  )-----------( 137      ( 01 Mar 2020 09:18 )             44.1     03-01    140  |  112<H>  |  9   ----------------------------<  122<H>  4.7   |  19<L>  |  0.71    Ca    9.0      01 Mar 2020 09:18  Phos  1.6     03-01  Mg     2.1     03-01    TPro  6.4  /  Alb  3.9  /  TBili  0.3  /  DBili  x   /  AST  65<H>  /  ALT  103<H>  /  AlkPhos  84  03-01    PT/INR - ( 29 Feb 2020 18:08 )   PT: 12.2 SEC;   INR: 1.07          PTT - ( 29 Feb 2020 18:08 )  PTT:29.6 SEC    CAPILLARY BLOOD GLUCOSE              REVIEW OF SYSTEMS:  CONSTITUTIONAL: +Fever, fatigue  EYES: No eye pain, visual disturbances, or discharge  RESPIRATORY: +Cough, SOB, wheeze  CARDIOVASCULAR: No chest pain, palpitations, dizziness, or leg swelling  GASTROINTESTINAL: No abdominal or epigastric pain. No nausea, vomiting  NEUROLOGICAL: No headaches, memory loss, loss of strength, numbness, or tremors  MUSCULOSKELETAL: No joint pain or swelling. +Myalgias earlier, now resolved  HEME/LYMPH: No easy bruising, or bleeding gums  ALLERY AND IMMUNOLOGIC: No hives or eczema    RADIOLOGY & ADDITIONAL TESTS:    Imaging Personally Reviewed:  [x] YES  [ ] NO, CT Chest    Consultant(s) Notes Reviewed:  [ ] YES  [x ] NO    PHYSICAL EXAM:  GENERAL: NAD, standing  HEAD:  Atraumatic, Normocephalic  EYES: EOMI, PERRLA, conjunctiva and sclera clear  NECK: Supple  NERVOUS SYSTEM:  Alert & Oriented X3, Good concentration; Motor Strength 5/5 B/L upper and lower extremities. Normal gait.  CHEST/LUNG: +Mild wheezing diffusely. No rhonchi.  HEART: Regular rate and rhythm; No murmurs, rubs, or gallops  ABDOMEN: Soft, Nontender, Nondistended; Bowel sounds present  EXTREMITIES:  2+ Peripheral Pulses, No clubbing, cyanosis, or edema  LYMPH: No lymphadenopathy noted  SKIN: No rashes or lesions    Care Discussed with Consultants/Other Providers [ ] YES  [x] NO

## 2020-03-01 NOTE — PHYSICAL THERAPY INITIAL EVALUATION ADULT - PERTINENT HX OF CURRENT PROBLEM, REHAB EVAL
Patient is a 64 year old female presenting to [facility] ED found to have flu. PMH includes HTN, cervical radiculopathy, asthma

## 2020-03-01 NOTE — PROGRESS NOTE ADULT - PROBLEM SELECTOR PLAN 3
-Tachycardia, Fever, and tachypnea 2/2 infulenza  - s/p 2L IVF in ED. Patient with limited PO intake.   - monitor VS.

## 2020-03-02 ENCOUNTER — TRANSCRIPTION ENCOUNTER (OUTPATIENT)
Age: 65
End: 2020-03-02

## 2020-03-02 VITALS — OXYGEN SATURATION: 99 %

## 2020-03-02 LAB
ALBUMIN SERPL ELPH-MCNC: 3.5 G/DL — SIGNIFICANT CHANGE UP (ref 3.3–5)
ALP SERPL-CCNC: 69 U/L — SIGNIFICANT CHANGE UP (ref 40–120)
ALT FLD-CCNC: 77 U/L — HIGH (ref 4–33)
ANION GAP SERPL CALC-SCNC: 10 MMO/L — SIGNIFICANT CHANGE UP (ref 7–14)
AST SERPL-CCNC: 39 U/L — HIGH (ref 4–32)
BILIRUB SERPL-MCNC: < 0.2 MG/DL — LOW (ref 0.2–1.2)
BUN SERPL-MCNC: 11 MG/DL — SIGNIFICANT CHANGE UP (ref 7–23)
CALCIUM SERPL-MCNC: 9 MG/DL — SIGNIFICANT CHANGE UP (ref 8.4–10.5)
CHLORIDE SERPL-SCNC: 113 MMOL/L — HIGH (ref 98–107)
CO2 SERPL-SCNC: 20 MMOL/L — LOW (ref 22–31)
CREAT SERPL-MCNC: 0.8 MG/DL — SIGNIFICANT CHANGE UP (ref 0.5–1.3)
GLUCOSE SERPL-MCNC: 89 MG/DL — SIGNIFICANT CHANGE UP (ref 70–99)
HCT VFR BLD CALC: 42.7 % — SIGNIFICANT CHANGE UP (ref 34.5–45)
HGB BLD-MCNC: 13.3 G/DL — SIGNIFICANT CHANGE UP (ref 11.5–15.5)
L PNEUMO AG UR QL: NEGATIVE — SIGNIFICANT CHANGE UP
MCHC RBC-ENTMCNC: 28.1 PG — SIGNIFICANT CHANGE UP (ref 27–34)
MCHC RBC-ENTMCNC: 31.1 % — LOW (ref 32–36)
MCV RBC AUTO: 90.3 FL — SIGNIFICANT CHANGE UP (ref 80–100)
NRBC # FLD: 0 K/UL — SIGNIFICANT CHANGE UP (ref 0–0)
PHOSPHATE SERPL-MCNC: 2.3 MG/DL — LOW (ref 2.5–4.5)
PLATELET # BLD AUTO: 132 K/UL — LOW (ref 150–400)
PMV BLD: 10.5 FL — SIGNIFICANT CHANGE UP (ref 7–13)
POTASSIUM SERPL-MCNC: 4.2 MMOL/L — SIGNIFICANT CHANGE UP (ref 3.5–5.3)
POTASSIUM SERPL-SCNC: 4.2 MMOL/L — SIGNIFICANT CHANGE UP (ref 3.5–5.3)
PROT SERPL-MCNC: 5.6 G/DL — LOW (ref 6–8.3)
RBC # BLD: 4.73 M/UL — SIGNIFICANT CHANGE UP (ref 3.8–5.2)
RBC # FLD: 14.6 % — HIGH (ref 10.3–14.5)
SODIUM SERPL-SCNC: 143 MMOL/L — SIGNIFICANT CHANGE UP (ref 135–145)
WBC # BLD: 5.56 K/UL — SIGNIFICANT CHANGE UP (ref 3.8–10.5)
WBC # FLD AUTO: 5.56 K/UL — SIGNIFICANT CHANGE UP (ref 3.8–10.5)

## 2020-03-02 PROCEDURE — 99239 HOSP IP/OBS DSCHRG MGMT >30: CPT

## 2020-03-02 RX ORDER — ACETAMINOPHEN 500 MG
20.31 TABLET ORAL
Qty: 0 | Refills: 0 | DISCHARGE
Start: 2020-03-02

## 2020-03-02 RX ORDER — ASPIRIN/CALCIUM CARB/MAGNESIUM 324 MG
1 TABLET ORAL
Qty: 30 | Refills: 0
Start: 2020-03-02 | End: 2020-03-31

## 2020-03-02 RX ORDER — LISINOPRIL 2.5 MG/1
2.5 TABLET ORAL DAILY
Refills: 0 | Status: DISCONTINUED | OUTPATIENT
Start: 2020-03-02 | End: 2020-03-02

## 2020-03-02 RX ORDER — BUDESONIDE AND FORMOTEROL FUMARATE DIHYDRATE 160; 4.5 UG/1; UG/1
2 AEROSOL RESPIRATORY (INHALATION)
Qty: 1 | Refills: 0
Start: 2020-03-02 | End: 2020-03-31

## 2020-03-02 RX ORDER — LANOLIN ALCOHOL/MO/W.PET/CERES
2 CREAM (GRAM) TOPICAL
Qty: 0 | Refills: 0 | DISCHARGE
Start: 2020-03-02

## 2020-03-02 RX ORDER — IBUPROFEN 200 MG
1 TABLET ORAL
Qty: 0 | Refills: 0 | DISCHARGE

## 2020-03-02 RX ORDER — NICOTINE POLACRILEX 2 MG
1 GUM BUCCAL
Qty: 21 | Refills: 0
Start: 2020-03-02 | End: 2020-03-22

## 2020-03-02 RX ORDER — ALBUTEROL 90 UG/1
2 AEROSOL, METERED ORAL
Qty: 0 | Refills: 0 | DISCHARGE

## 2020-03-02 RX ORDER — ALBUTEROL 90 UG/1
2 AEROSOL, METERED ORAL
Qty: 1 | Refills: 0
Start: 2020-03-02 | End: 2020-03-31

## 2020-03-02 RX ORDER — SODIUM,POTASSIUM PHOSPHATES 278-250MG
1 POWDER IN PACKET (EA) ORAL
Refills: 0 | Status: DISCONTINUED | OUTPATIENT
Start: 2020-03-02 | End: 2020-03-02

## 2020-03-02 RX ADMIN — Medication 1 TABLET(S): at 12:56

## 2020-03-02 RX ADMIN — FAMOTIDINE 40 MILLIGRAM(S): 10 INJECTION INTRAVENOUS at 12:56

## 2020-03-02 RX ADMIN — Medication 100 MILLIGRAM(S): at 14:46

## 2020-03-02 RX ADMIN — BUDESONIDE AND FORMOTEROL FUMARATE DIHYDRATE 2 PUFF(S): 160; 4.5 AEROSOL RESPIRATORY (INHALATION) at 13:00

## 2020-03-02 RX ADMIN — Medication 3 MILLILITER(S): at 03:25

## 2020-03-02 RX ADMIN — Medication 81 MILLIGRAM(S): at 12:56

## 2020-03-02 RX ADMIN — Medication 40 MILLIGRAM(S): at 07:57

## 2020-03-02 RX ADMIN — Medication 650 MILLIGRAM(S): at 00:15

## 2020-03-02 RX ADMIN — LISINOPRIL 2.5 MILLIGRAM(S): 2.5 TABLET ORAL at 12:56

## 2020-03-02 RX ADMIN — Medication 1 PATCH: at 07:53

## 2020-03-02 RX ADMIN — Medication 3 MILLILITER(S): at 14:35

## 2020-03-02 RX ADMIN — Medication 3 MILLILITER(S): at 08:42

## 2020-03-02 RX ADMIN — Medication 100 MILLIGRAM(S): at 07:57

## 2020-03-02 RX ADMIN — Medication 75 MILLIGRAM(S): at 07:58

## 2020-03-02 NOTE — PROGRESS NOTE ADULT - PROBLEM SELECTOR PLAN 2
Asthma exacerbation in setting of flu. She is current every day smoker.  - Acute respiratory acidosis with VBG showing pH of 7.18 and pCO2 72. Now improved  - will continue with home symbicort  - Duoneb ATC - transition to MDI at home.  - ordered short course of Prednisone 40mg daily - total of 5 days.  - currently breathing well on RA satting 96%.  - monitor O2.  - discussed smoking cessation. Offered NRT, yet patient deferred.

## 2020-03-02 NOTE — DISCHARGE NOTE PROVIDER - NSDCMRMEDTOKEN_GEN_ALL_CORE_FT
acetaminophen 160 mg/5 mL oral suspension: 20.31 milliliter(s) orally every 6 hours, As needed, Temp greater or equal to 38C (100.4F), Mild Pain (1 - 3), Moderate Pain (4 - 6)  albuterol 90 mcg/inh inhalation aerosol: 2 puff(s) inhaled every 6 hours, As Needed  aspirin 81 mg oral tablet, chewable: 1 tab(s) orally once a day  atorvastatin 40 mg oral tablet: 1 tab(s) orally once a day (at bedtime)  benzonatate 100 mg oral capsule: 1 cap(s) orally 3 times a day  budesonide-formoterol 160 mcg-4.5 mcg/inh inhalation aerosol: 2 puff(s) inhaled 2 times a day  famotidine 40 mg oral tablet: 1 tab(s) orally once a day  Flexeril 5 mg oral tablet: 1 tab(s) orally 3 times a day, As Needed  lisinopril 2.5 mg oral tablet: 1 tab(s) orally once a day  melatonin 3 mg oral tablet: 2 tab(s) orally once a day (at bedtime)  nicotine 7 mg/24 hr transdermal film, extended release: 1 patch transdermal once a day x21 days  oseltamivir 75 mg oral capsule: 1 cap(s) orally 2 times a day  predniSONE 20 mg oral tablet: 2 tab(s) orally once a day

## 2020-03-02 NOTE — PROGRESS NOTE ADULT - PROBLEM SELECTOR PLAN 7
Transitions of Care Status:  1.  Name of PCP: Dr. Henna Thomas [phone]  2.  PCP Contacted on Admission: [ ] Y    [x ] N  - night admission  3.  PCP contacted at Discharge: [ ] Y    [x ] N    [ ] N/A - no response on the phone.  4.  Post-Discharge Appointment Date and Location:  5.  Summary of Handoff given to PCP:

## 2020-03-02 NOTE — DISCHARGE NOTE NURSING/CASE MANAGEMENT/SOCIAL WORK - PATIENT PORTAL LINK FT
You can access the FollowMyHealth Patient Portal offered by [facility] by registering at the following website: [email or web address]. By joining [employer]’s FollowMyHealth portal, you will also be able to view your health information using other applications (apps) compatible with our system.

## 2020-03-02 NOTE — DISCHARGE NOTE PROVIDER - HOSPITAL COURSE
Mrs. Pratt is a 65 y/o female w/ Asthma, GERD, Cervical/Thoracic radiculopathy with herniated disc, and current smoker, who is admitted for sepsis and acute asthma exacerbation. Positive Flu        Influenza- Flu + on RVP started Tamiflu 5 day course till 3/5    - droplet precaution        Pneumonia- CXR with right lower lobe opacity concerning for PNA, viral (flu) vs. bacterial    - BCx- NTD, UA- negative    - hold off ABx as her symptoms c/w influenza infection.     - CT chest- no PNA         Asthma exacerbation- She is current every day smoker    - continue with home Symbicort, Duoneb ATC    - short course of Prednisone 40mg daily till 3/4    - discussed smoking cessation. Offered Nicotine patch, pt agreed        Sepsis- Tachycardia, Fever, and tachypnea. RVP + for flu and CXR with possibile PNA    - s/p IVF, treat Flu with Tamiflu     - Infectious work up -negative, Iron studies -low normal, Vit B12- 560, Folate-7.7        HTN- initially hypertensive in setting of dyspnea and anxiety. now back down to normotensive    - holding home lisinopril in setting of sepsis    - TSH- 0.24, FT4- 1.15        Chronic GERD- c/w home Pepcid        Radiculopathy, cervicothoracic region- c/w home Flexeril, holding NSAIDs in setting of abdominal discomfort and initial HTN    - pt takes tramadol once in a while, yet iSTOP (reference #: [id number])    - f/u with outpatient Neurologist    - fall precaution. PT - no needs    Dispo -home with outpatient follow up

## 2020-03-02 NOTE — PROGRESS NOTE ADULT - PROBLEM SELECTOR PLAN 5
- c/w home flexeril.  - holding NSAIDs in setting of abdominal discomfort and initial HTN.  - patient states that she takes tramadol once in a while, yet iSTOP (reference #: [id number])  - f/u with outpatient neurologist

## 2020-03-02 NOTE — PROGRESS NOTE ADULT - PROBLEM SELECTOR PLAN 1
flu + on RVP. Sx onset within 48 hrs.   - finish tamiflu 5 day course.  - treat for asthma exacerbation as below.  - droplet precaution  - Still with cough, symptomatic treatment  -CT Chest with clear lungs, no pneumonia

## 2020-03-02 NOTE — PROGRESS NOTE ADULT - SUBJECTIVE AND OBJECTIVE BOX
[facility] Division of Hospital Medicine  Steven Velez MD  Pager ([facility], 8A-5P): [phone]  Other Times:  [phone]    Patient is a 64y old  Female who presents with a chief complaint of Asthma exacerbation 2/2 Flu (01 Mar 2020 13:32)    SUBJECTIVE / OVERNIGHT EVENTS:  Patient states that her coughing and wheezing is much improved.  "I feel like I'm going crazy with anxiety," but denies SI/HI.  Attributing to steroid.  No F/C, N/V, CP, SOB, Cough, lightheadedness, dizziness, abdominal pain, diarrhea, dysuria.    MEDICATIONS  (STANDING):  albuterol/ipratropium for Nebulization 3 milliLiter(s) Nebulizer every 6 hours  aspirin enteric coated 81 milliGRAM(s) Oral daily  atorvastatin 40 milliGRAM(s) Oral at bedtime  benzonatate 100 milliGRAM(s) Oral three times a day  budesonide 160 MICROgram(s)/formoterol 4.5 MICROgram(s) Inhaler 2 Puff(s) Inhalation two times a day  famotidine    Tablet 40 milliGRAM(s) Oral daily  heparin  Injectable 5000 Unit(s) SubCutaneous every 12 hours  influenza   Vaccine 0.5 milliLiter(s) IntraMuscular once  lisinopril 2.5 milliGRAM(s) Oral daily  melatonin 6 milliGRAM(s) Oral at bedtime  nicotine -   7 mG/24Hr(s) Patch 1 patch Transdermal daily  oseltamivir 75 milliGRAM(s) Oral two times a day  potassium acid phosphate/sodium acid phosphate tablet (K-PHOS No. 2) 1 Tablet(s) Oral four times a day with meals  predniSONE   Tablet 40 milliGRAM(s) Oral daily    MEDICATIONS  (PRN):  acetaminophen    Suspension .. 650 milliGRAM(s) Oral every 6 hours PRN Temp greater or equal to 38C (100.4F), Mild Pain (1 - 3), Moderate Pain (4 - 6)      Vital Signs Last 24 Hrs  T(C): 36.4 (02 Mar 2020 11:47), Max: 36.9 (02 Mar 2020 02:16)  T(F): 97.5 (02 Mar 2020 11:47), Max: 98.5 (02 Mar 2020 02:16)  HR: 87 (02 Mar 2020 11:47) (69 - 87)  BP: 127/78 (02 Mar 2020 11:47) (112/84 - 133/72)  BP(mean): --  RR: 18 (02 Mar 2020 11:47) (17 - 18)  SpO2: 95% (02 Mar 2020 11:47) (95% - 99%)  CAPILLARY BLOOD GLUCOSE        I&O's Summary    01 Mar 2020 07:01  -  02 Mar 2020 07:00  --------------------------------------------------------  IN: 1000 mL / OUT: 550 mL / NET: 450 mL    02 Mar 2020 07:01  -  02 Mar 2020 12:53  --------------------------------------------------------  IN: 300 mL / OUT: 0 mL / NET: 300 mL        PHYSICAL EXAM:  GENERAL: NAD  HEAD:  Atraumatic, Normocephalic  EYES: EOMI, PERRLA, conjunctiva and sclera clear  NECK: Supple, No JVD  CHEST/LUNG: Clear to auscultation bilaterally; No wheeze  HEART: Regular rate and rhythm; No murmurs, rubs, or gallops  ABDOMEN: Soft, Nontender, Nondistended; Bowel sounds present  EXTREMITIES:  2+ Peripheral Pulses, No clubbing, cyanosis, or edema  PSYCH: Calm  NEUROLOGY: A/Ox3, non-focal  SKIN: No rashes or lesions    LABS:                        13.3   5.56  )-----------( 132      ( 02 Mar 2020 07:23 )             42.7     03-02    143  |  113<H>  |  11  ----------------------------<  89  4.2   |  20<L>  |  0.80    Ca    9.0      02 Mar 2020 07:23  Phos  2.3     03-02  Mg     2.1     03-01    TPro  5.6<L>  /  Alb  3.5  /  TBili  < 0.2<L>  /  DBili  x   /  AST  39<H>  /  ALT  77<H>  /  AlkPhos  69  03-02    PT/INR - ( 2020 18:08 )   PT: 12.2 SEC;   INR: 1.07          PTT - ( 2020 18:08 )  PTT:29.6 SEC      Urinalysis Basic - ( 01 Mar 2020 16:46 )    Color: LIGHT YELLOW / Appearance: CLEAR / S.010 / pH: 6.0  Gluc: NEGATIVE / Ketone: NEGATIVE  / Bili: NEGATIVE / Urobili: NORMAL   Blood: NEGATIVE / Protein: NEGATIVE / Nitrite: NEGATIVE   Leuk Esterase: NEGATIVE / RBC: x / WBC x   Sq Epi: x / Non Sq Epi: x / Bacteria: x        RADIOLOGY & ADDITIONAL TESTS:    Imaging Personally Reviewed:    Care Discussed with Consultants/Other Providers:

## 2020-03-02 NOTE — PROGRESS NOTE ADULT - PROBLEM SELECTOR PLAN 3
initially hypertensive in setting of dyspnea and anxiety. now back down to normotensive.  - holding home lisinopril in setting of sepsis - resume on discharge

## 2020-03-02 NOTE — DISCHARGE NOTE PROVIDER - NSDCCPCAREPLAN_GEN_ALL_CORE_FT
PRINCIPAL DISCHARGE DIAGNOSIS  Diagnosis: Influenza  Assessment and Plan of Treatment: - you were treated with Tamiflu in the hospital, continue taking till 3/5/20 as prescribed. Your CT chest showed no Pneumonia   - Infectious work up -negative, Iron studies -low normal, Vitamin B12 level- 560, Folate-7.7  - Follow up with your PCP Dr Thomas as outpatient for further management      SECONDARY DISCHARGE DIAGNOSES  Diagnosis: Asthma exacerbation  Assessment and Plan of Treatment: - continue with home Symbicort, Duoneb Nebulizer treatment  - you were started on a short course of Prednisone 40mg daily till 3/4  - smoking cessations, continue using Nicotine patched as prescribed    Diagnosis: HTN (hypertension)  Assessment and Plan of Treatment: - Continue current blood pressure medication regimen as directed   - Monitor for any visual changes, headaches or dizziness.  Monitor blood pressure regularly   - Follow up with your PCP for further management for high blood pressure, please call to make appointment within 1 week of discharge    Diagnosis: Radiculopathy, cervicothoracic region  Assessment and Plan of Treatment: - continue taking pain meds as prescribed, fall precautions  - Follow up with PCP as outpatient for further management

## 2020-03-05 LAB
BACTERIA BLD CULT: SIGNIFICANT CHANGE UP
BACTERIA BLD CULT: SIGNIFICANT CHANGE UP

## 2021-12-11 ENCOUNTER — EMERGENCY (EMERGENCY)
Facility: HOSPITAL | Age: 66
LOS: 1 days | Discharge: HOME | End: 2021-12-11
Attending: EMERGENCY MEDICINE
Payer: MEDICARE

## 2021-12-11 VITALS
OXYGEN SATURATION: 96 % | HEIGHT: 62 IN | SYSTOLIC BLOOD PRESSURE: 154 MMHG | TEMPERATURE: 98 F | HEART RATE: 77 BPM | RESPIRATION RATE: 18 BRPM | WEIGHT: 134.92 LBS | DIASTOLIC BLOOD PRESSURE: 87 MMHG

## 2021-12-11 VITALS
RESPIRATION RATE: 18 BRPM | DIASTOLIC BLOOD PRESSURE: 93 MMHG | HEART RATE: 78 BPM | TEMPERATURE: 98 F | OXYGEN SATURATION: 99 % | SYSTOLIC BLOOD PRESSURE: 166 MMHG

## 2021-12-11 DIAGNOSIS — Z90.49 ACQUIRED ABSENCE OF OTHER SPECIFIED PARTS OF DIGESTIVE TRACT: Chronic | ICD-10-CM

## 2021-12-11 PROBLEM — I10 ESSENTIAL (PRIMARY) HYPERTENSION: Chronic | Status: ACTIVE | Noted: 2020-02-29

## 2021-12-11 PROBLEM — J45.909 UNSPECIFIED ASTHMA, UNCOMPLICATED: Chronic | Status: ACTIVE | Noted: 2020-02-29

## 2021-12-11 PROBLEM — K21.9 GASTRO-ESOPHAGEAL REFLUX DISEASE WITHOUT ESOPHAGITIS: Chronic | Status: ACTIVE | Noted: 2020-02-29

## 2021-12-11 PROBLEM — M54.12 RADICULOPATHY, CERVICAL REGION: Chronic | Status: ACTIVE | Noted: 2020-02-29

## 2021-12-11 LAB
ALBUMIN SERPL ELPH-MCNC: 4.4 G/DL — SIGNIFICANT CHANGE UP (ref 3.3–5)
ALP SERPL-CCNC: 98 U/L — SIGNIFICANT CHANGE UP (ref 40–120)
ALT FLD-CCNC: 89 U/L — HIGH (ref 10–45)
ANION GAP SERPL CALC-SCNC: 11 MMOL/L — SIGNIFICANT CHANGE UP (ref 5–17)
APPEARANCE UR: CLEAR — SIGNIFICANT CHANGE UP
AST SERPL-CCNC: 28 U/L — SIGNIFICANT CHANGE UP (ref 10–40)
BASOPHILS # BLD AUTO: 0.04 K/UL — SIGNIFICANT CHANGE UP (ref 0–0.2)
BASOPHILS NFR BLD AUTO: 0.6 % — SIGNIFICANT CHANGE UP (ref 0–2)
BILIRUB SERPL-MCNC: 0.4 MG/DL — SIGNIFICANT CHANGE UP (ref 0.2–1.2)
BILIRUB UR-MCNC: NEGATIVE — SIGNIFICANT CHANGE UP
BUN SERPL-MCNC: 12 MG/DL — SIGNIFICANT CHANGE UP (ref 7–23)
CALCIUM SERPL-MCNC: 10.2 MG/DL — SIGNIFICANT CHANGE UP (ref 8.4–10.5)
CHLORIDE SERPL-SCNC: 106 MMOL/L — SIGNIFICANT CHANGE UP (ref 96–108)
CO2 SERPL-SCNC: 22 MMOL/L — SIGNIFICANT CHANGE UP (ref 22–31)
COLOR SPEC: SIGNIFICANT CHANGE UP
CREAT SERPL-MCNC: 0.79 MG/DL — SIGNIFICANT CHANGE UP (ref 0.5–1.3)
DIFF PNL FLD: NEGATIVE — SIGNIFICANT CHANGE UP
EOSINOPHIL # BLD AUTO: 0.09 K/UL — SIGNIFICANT CHANGE UP (ref 0–0.5)
EOSINOPHIL NFR BLD AUTO: 1.3 % — SIGNIFICANT CHANGE UP (ref 0–6)
GLUCOSE SERPL-MCNC: 90 MG/DL — SIGNIFICANT CHANGE UP (ref 70–99)
GLUCOSE UR QL: NEGATIVE — SIGNIFICANT CHANGE UP
HCT VFR BLD CALC: 45.8 % — HIGH (ref 34.5–45)
HGB BLD-MCNC: 14.8 G/DL — SIGNIFICANT CHANGE UP (ref 11.5–15.5)
IMM GRANULOCYTES NFR BLD AUTO: 0.3 % — SIGNIFICANT CHANGE UP (ref 0–1.5)
KETONES UR-MCNC: SIGNIFICANT CHANGE UP
LEUKOCYTE ESTERASE UR-ACNC: NEGATIVE — SIGNIFICANT CHANGE UP
LYMPHOCYTES # BLD AUTO: 1.62 K/UL — SIGNIFICANT CHANGE UP (ref 1–3.3)
LYMPHOCYTES # BLD AUTO: 24.1 % — SIGNIFICANT CHANGE UP (ref 13–44)
MAGNESIUM SERPL-MCNC: 2 MG/DL — SIGNIFICANT CHANGE UP (ref 1.6–2.6)
MCHC RBC-ENTMCNC: 27.7 PG — SIGNIFICANT CHANGE UP (ref 27–34)
MCHC RBC-ENTMCNC: 32.3 GM/DL — SIGNIFICANT CHANGE UP (ref 32–36)
MCV RBC AUTO: 85.6 FL — SIGNIFICANT CHANGE UP (ref 80–100)
MONOCYTES # BLD AUTO: 0.37 K/UL — SIGNIFICANT CHANGE UP (ref 0–0.9)
MONOCYTES NFR BLD AUTO: 5.5 % — SIGNIFICANT CHANGE UP (ref 2–14)
NEUTROPHILS # BLD AUTO: 4.58 K/UL — SIGNIFICANT CHANGE UP (ref 1.8–7.4)
NEUTROPHILS NFR BLD AUTO: 68.2 % — SIGNIFICANT CHANGE UP (ref 43–77)
NITRITE UR-MCNC: NEGATIVE — SIGNIFICANT CHANGE UP
NRBC # BLD: 0 /100 WBCS — SIGNIFICANT CHANGE UP (ref 0–0)
PH UR: 6 — SIGNIFICANT CHANGE UP (ref 5–8)
PLATELET # BLD AUTO: 217 K/UL — SIGNIFICANT CHANGE UP (ref 150–400)
POTASSIUM SERPL-MCNC: 3.9 MMOL/L — SIGNIFICANT CHANGE UP (ref 3.5–5.3)
POTASSIUM SERPL-SCNC: 3.9 MMOL/L — SIGNIFICANT CHANGE UP (ref 3.5–5.3)
PROT SERPL-MCNC: 7.1 G/DL — SIGNIFICANT CHANGE UP (ref 6–8.3)
PROT UR-MCNC: NEGATIVE — SIGNIFICANT CHANGE UP
RAPID RVP RESULT: SIGNIFICANT CHANGE UP
RBC # BLD: 5.35 M/UL — HIGH (ref 3.8–5.2)
RBC # FLD: 13.7 % — SIGNIFICANT CHANGE UP (ref 10.3–14.5)
SARS-COV-2 RNA SPEC QL NAA+PROBE: SIGNIFICANT CHANGE UP
SODIUM SERPL-SCNC: 139 MMOL/L — SIGNIFICANT CHANGE UP (ref 135–145)
SP GR SPEC: 1.01 — SIGNIFICANT CHANGE UP (ref 1.01–1.02)
UROBILINOGEN FLD QL: NEGATIVE — SIGNIFICANT CHANGE UP
WBC # BLD: 6.72 K/UL — SIGNIFICANT CHANGE UP (ref 3.8–10.5)
WBC # FLD AUTO: 6.72 K/UL — SIGNIFICANT CHANGE UP (ref 3.8–10.5)

## 2021-12-11 PROCEDURE — 71045 X-RAY EXAM CHEST 1 VIEW: CPT | Mod: 26

## 2021-12-11 PROCEDURE — 93010 ELECTROCARDIOGRAM REPORT: CPT | Mod: GC

## 2021-12-11 PROCEDURE — 71045 X-RAY EXAM CHEST 1 VIEW: CPT

## 2021-12-11 PROCEDURE — 85025 COMPLETE CBC W/AUTO DIFF WBC: CPT

## 2021-12-11 PROCEDURE — 81003 URINALYSIS AUTO W/O SCOPE: CPT

## 2021-12-11 PROCEDURE — 83735 ASSAY OF MAGNESIUM: CPT

## 2021-12-11 PROCEDURE — 0225U NFCT DS DNA&RNA 21 SARSCOV2: CPT

## 2021-12-11 PROCEDURE — 87086 URINE CULTURE/COLONY COUNT: CPT

## 2021-12-11 PROCEDURE — 99284 EMERGENCY DEPT VISIT MOD MDM: CPT | Mod: 25

## 2021-12-11 PROCEDURE — 99284 EMERGENCY DEPT VISIT MOD MDM: CPT | Mod: CS,25,GC

## 2021-12-11 PROCEDURE — 93005 ELECTROCARDIOGRAM TRACING: CPT

## 2021-12-11 PROCEDURE — 36415 COLL VENOUS BLD VENIPUNCTURE: CPT

## 2021-12-11 PROCEDURE — 80053 COMPREHEN METABOLIC PANEL: CPT

## 2021-12-11 RX ORDER — SODIUM CHLORIDE 9 MG/ML
1000 INJECTION INTRAMUSCULAR; INTRAVENOUS; SUBCUTANEOUS ONCE
Refills: 0 | Status: COMPLETED | OUTPATIENT
Start: 2021-12-11 | End: 2021-12-11

## 2021-12-11 RX ORDER — ACETAMINOPHEN 500 MG
650 TABLET ORAL ONCE
Refills: 0 | Status: COMPLETED | OUTPATIENT
Start: 2021-12-11 | End: 2021-12-11

## 2021-12-11 RX ADMIN — SODIUM CHLORIDE 1000 MILLILITER(S): 9 INJECTION INTRAMUSCULAR; INTRAVENOUS; SUBCUTANEOUS at 19:37

## 2021-12-11 RX ADMIN — Medication 650 MILLIGRAM(S): at 21:34

## 2021-12-11 NOTE — ED ADULT TRIAGE NOTE - BSA (M2)
Two patient identifiers verified.  Covid nasal swab collected with consent given.  Patient tolerated procedure well.    
1.62

## 2021-12-11 NOTE — ED ADULT NURSE NOTE - CAS TRG GENERAL NORM CIRC DET
Problem: Pain:  Goal: Pain level will decrease  Description: Pain level will decrease  7/22/2020 1145 by Latonia Romo RN  Outcome: Ongoing   Pain/discomfort being managed with PRN analgesics per MD orders. Pt able to express presence and absence of pain and rate pain appropriately using numerical scale. Problem: Pain:  Goal: Control of acute pain  Description: Control of acute pain  7/22/2020 1145 by Latonia Romo RN  Outcome: Ongoing     Problem: Falls - Risk of:  Goal: Will remain free from falls  Description: Will remain free from falls  7/22/2020 1145 by Latonia Romo RN  Outcome: Ongoing   Fall risk assessment completed . Fall precautions in place, bed/ chair alarm on, side rails 2/4 up, call light in reach, educated pt on calling for assistance when needed, room clear of clutter. Pt verbalized understanding. Problem: Nausea/Vomiting:  Goal: Ability to achieve adequate nutritional intake will improve  Description: Ability to achieve adequate nutritional intake will improve  Outcome: Ongoing     Problem: Skin Integrity:  Goal: Absence of new skin breakdown  Description: Absence of new skin breakdown  7/22/2020 1145 by Latonia Romo RN  Outcome: Ongoing   Skin assessment completed every shift. Pt assessed for incontinence, appropriate barrier cream applied prn. Pt encouraged to turn/rotate every 2 hours. Assistance provided if pt unable to do so themselves. Strong peripheral pulses

## 2021-12-11 NOTE — ED PROVIDER NOTE - NS ED ROS FT
Gen: Denies weight loss  CV: Denies chest pain, palpitations  Skin: Denies rash, erythema, color changes  Resp: Denies SOB, cough  Endo: Denies sensitivity to heat, cold, increased urination  GI: Denies diarrhea  Msk: Denies LE swelling, extremity pain  : Denies dysuria, increased frequency  Neuro: Denies LOC, weakness

## 2021-12-11 NOTE — ED PROVIDER NOTE - OBJECTIVE STATEMENT
67 y/o female with pmhx of HTN, HLD, Asthma, and GERD presenting with viral like symptoms. Had pfizer booster on Tuesday and on Wednesday started to feel some body aches and sinus congestion, tmax of 100.6, with decreased PO intake. 2 episodes of non-bloody emesis with associated sinus congestion. Improvement of symptoms since symptom onset. General fatigue but denies chest pain, SOB, focal weakness, numbness/tingling, abdominal pain, rashes or changes in urination (dysuria/hematuria).

## 2021-12-11 NOTE — ED PROVIDER NOTE - CLINICAL SUMMARY MEDICAL DECISION MAKING FREE TEXT BOX
67 y/o female with pmhx of HTN, HLD, Asthma, and GERD presenting with viral like symptoms s/p booster on Tuesday, hemodynamically stable and well appearing, mildly dry mucous membranes. Will check basic labs, CXR, and EKG 1LNS bolus and reassess

## 2021-12-11 NOTE — ED ADULT TRIAGE NOTE - PATIENT ON (OXYGEN DELIVERY METHOD)
room air Stelara Counseling:  I discussed with the patient the risks of ustekinumab including but not limited to immunosuppression, malignancy, posterior leukoencephalopathy syndrome, and serious infections.  The patient understands that monitoring is required including a PPD at baseline and must alert us or the primary physician if symptoms of infection or other concerning signs are noted.

## 2021-12-11 NOTE — ED PROVIDER NOTE - PHYSICAL EXAMINATION
Gen: WDWN, NAD, comfortable appearing   HEENT: PERRLA, EOMI, no nasal discharge, mucous membranes mildly dry, no oropharyngeal edema/erythema/exudates   CV: RRR, +S1/S2, no M/R/G, equal b/l radial pulses 2+  Resp: CTAB, no W/R/R, SPO2 100% on RA, no increased WOB   GI: Abdomen soft non-distended, NTTP, no masses/organomegaly   MSK/Skin: No CVA tenderness, no open wounds, no bruising, no LE edema  Neuro: CN2-12 grossly intact, A&Ox4, MS +5/5 in UE and LE BL, gross sensation intact in UE and LE BL  Psych: appropriate mood

## 2021-12-11 NOTE — ED PROVIDER NOTE - PATIENT PORTAL LINK FT
You can access the FollowMyHealth Patient Portal offered by [facility] by registering at the following website: [email or web address]. By joining [employer]’s FollowMyHealth portal, you will also be able to view your health information using other applications (apps) compatible with our system.

## 2021-12-11 NOTE — ED ADULT NURSE NOTE - NSICDXPASTMEDICALHX_GEN_ALL_CORE_FT
PAST MEDICAL HISTORY:  Asthma     Cervical radiculopathy     Chronic GERD     HTN (hypertension)

## 2021-12-11 NOTE — ED PROVIDER NOTE - ATTENDING CONTRIBUTION TO CARE
RGUJRAL 65yo f hx listed presents with weakness, sinus congestion, HA and nausea since Wednesday. States she had her pfizer booster on tuesday and symptoms began after. Pt had a low grade temp but symptoms have not improved. Decreased appetite and intake. No chest pain, palp, sob. Pt is ambulatory in the ED speaking full sentences.   on exam, Patient is awake, alert and oriented x 3.  Patient is well appearing and in no acute distress.  NCAT  Neck is supple  Lungs are CTA B/L,+S1S2 no murmurs,  Abdomen:Soft nd/nt+bs no rebound or guarding.  Extremity no edema or calf tender.  Skin with no rash.  Neuro CN3-12 intact. Strength 5/5 in upper and lower extremities. Nml Sensation.Gait normal.   Pt well appearing. Check labs, xray chest and IVF and re eval.

## 2021-12-13 LAB
CULTURE RESULTS: SIGNIFICANT CHANGE UP
SPECIMEN SOURCE: SIGNIFICANT CHANGE UP

## 2022-09-26 NOTE — ED PROVIDER NOTE - CADM POA PRESS ULCER
Unable to assess at this time due to patient’s acuity Mirvaso Counseling: Mirvaso is a topical medication which can decrease superficial blood flow where applied. Side effects are uncommon and include stinging, redness and allergic reactions.

## 2022-12-15 NOTE — ED PROVIDER NOTE - NEUROLOGICAL, MLM
Alert and oriented, no focal deficits, no motor or sensory deficits. Ear Star Wedge Flap Text: The defect edges were debeveled with a #15 blade scalpel.  Given the location of the defect and the proximity to free margins (helical rim) an ear star wedge flap was deemed most appropriate.  Using a sterile surgical marker, the appropriate flap was drawn incorporating the defect and placing the expected incisions between the helical rim and antihelix where possible.  The area thus outlined was incised through and through with a #15 scalpel blade.

## 2023-01-04 NOTE — PATIENT PROFILE ADULT - BRADEN MOISTURE
Department of Anesthesiology  Preprocedure Note       Name:  Saleem Fong   Age:  47 y.o.  :  1969                                          MRN:  [id number]         Date:  2023      Surgeon: Alivia Shultz):  Elaine Hodgson DO    Procedure: COLONOSCOPY DIAGNOSTIC  ++LATEX ALLERGY++    Medications prior to admission:   Prior to Admission medications    Medication Sig Start Date End Date Taking? Authorizing Provider   polyethylene glycol (MIRALAX) 17 GM/SCOOP powder Take 17 g by mouth daily for 14 days.  Dispense QS, and no refills  Patient not taking: Reported on 22  Lugenia Rash, DO   traZODone (DESYREL) 50 MG tablet take 1 tablet by mouth at bedtime  Patient not taking: Reported on 2023 12/15/22   Historical Provider, MD   buPROPion (WELLBUTRIN) 75 MG tablet take 1 tablet by mouth twice a day  Patient not taking: Reported on 2023 12/3/22   Historical Provider, MD   pantoprazole (PROTONIX) 40 MG tablet take 1 tablet by mouth EVERY MORNING BEFORE BREAKFAST 22   [place] SHANE Persaud DO   nadolol (CORGARD) 20 MG tablet take 1 tablet by mouth once daily 22   [place] SHANE Persaud DO   docusate sodium (STOOL SOFTENER) 100 MG capsule Take 1 capsule by mouth 2 times daily as needed for Constipation  Patient not taking: Reported on 22   FRENCH Melendez - CNP   senna (SENOKOT) 8.6 MG tablet Take 1 tablet by mouth 2 times daily 22  Warren Eisenberg APRN - OUMOU   linaclotide Salina Vitaly) 145 MCG capsule Take 1 capsule by mouth every morning (before breakfast) 22   FRENCH Melendez - CNP   baclofen (LIORESAL) 10 MG tablet Take 1 tablet by mouth 3 times daily 22   Ankush Sykes,    ondansetron (ZOFRAN ODT) 4 MG disintegrating tablet Take 1 tablet by mouth every 8 hours as needed for Nausea or Vomiting May substitute for covered product 22   Georgina Remodesta, DO   rosuvastatin (CRESTOR) 20 MG tablet take 1 tablet by mouth nightly 8/30/22   [place] SHANE Persaud DO   Eptinezumab-jjmr (VYEPTI IV) Infuse intravenously every 3 months    Historical Provider, MD   DULoxetine (CYMBALTA) 60 MG extended release capsule 60 mg 2 times daily 3/2/22   Historical Provider, MD   diazepam (VALIUM) 10 MG tablet Take 10 mg by mouth in the morning and 10 mg at noon and 10 mg in the evening. Instructed to take am of procedure if needed. Historical Provider, MD   folic acid (FOLVITE) 1 MG tablet Take 1 mg by mouth daily. Historical Provider, MD   lamoTRIgine (LAMICTAL) 200 MG tablet Take 200 mg by mouth every evening     Historical Provider, MD       Current medications:    No current facility-administered medications for this visit. No current outpatient medications on file. Facility-Administered Medications Ordered in Other Visits   Medication Dose Route Frequency Provider Last Rate Last Admin    sodium chloride flush 0.9 % injection 5-40 mL  5-40 mL IntraVENous 2 times per day Orie Schlichter, DO        sodium chloride flush 0.9 % injection 5-40 mL  5-40 mL IntraVENous PRN Orie Schlichter, DO        0.9 % sodium chloride infusion  25 mL IntraVENous PRN Orie Schlichter, DO        lactated ringers infusion   IntraVENous Continuous Orie Schlichter, DO           Allergies:     Allergies   Allergen Reactions    Latex Rash    Sulfa Antibiotics Rash and Hives       Problem List:    Patient Active Problem List   Diagnosis Code    Right hip pain M25.551    Anxiety F41.9    Recurrent major depression in partial remission (HCC) F33.41    New daily persistent headache G44.52    Tobacco use disorder F17.200    Mixed hyperlipidemia E78.2    History of migraine Z86.69    Frequency of micturition R35.0    Right hip joint effusion M25.451    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    Chronic low back pain without sciatica M54.50, G89.29    Spondylosis of cervical region without myelopathy or radiculopathy M47.812    Intractable abdominal pain R10.9    Pancreatic cyst K86.2    Intractable nausea and vomiting R11.2    Intractable chronic post-traumatic headache G44.321    Constipation K59.00       Past Medical History:        Diagnosis Date    Anxiety     Arthritis     right hip    Bipolar 1 disorder ([address]     Cervical stenosis of spine     Chronic back pain     Constipation     Depression     Hypertension     Intractable chronic post-traumatic headache 04/21/2022    Irritable bowel syndrome     Migraines     Mucocele of lip     for surgery 7/21/21    Other hyperlipidemia 07/29/2019    PTSD (post-traumatic stress disorder)        Past Surgical History:        Procedure Laterality Date    ARTHROGRAPHY Right 1/3/2019    RIGHT HIP ARTHROGRAM AND STEROID INJECTION performed by Hyacinth Harman MD at [address] [place] [address] Right 9/10/2020    RIGHT HIP INJECTION UNDER FLUOROSCOPY performed by Hyacinth Harman MD at [address] [place] [address] Right 4/13/2021    RIGHT HIP INJECTION UNDER FLUOROSCOPY performed by Hyacinth Harman MD at [address] [place] [address]      COLONOSCOPY      DILATION AND CURETTAGE OF UTERUS N/A 2/3/2020    DILATATION AND CURETTAGE HYSTEROSCOPY performed by Trinity Mariee MD at [facility] 7/21/2021    EXCISION RIGHT LIP MUCOCELE performed by Esha Owen MD at [address] [place] [address]  02/20/2019    implant for IBS     STIMULATOR SURGERY      bowel    TONSILLECTOMY      TOOTH EXTRACTION      TUBAL LIGATION      UPPER GASTROINTESTINAL ENDOSCOPY N/A 3/28/2022    EGD ESOPHAGOGASTRODUODENOSCOPY ULTRASOUND performed by Susan Johnson MD at [facility] History:    Social History     Tobacco Use    Smoking status: Every Day     Packs/day: 1.00     Years: 30.00     Pack years: 30.00     Types: Cigarettes    Smokeless tobacco: Never    Tobacco comments:     trying to quit, wears patch   Substance Use Topics    Alcohol use: Not Currently Ready to quit: Not Answered  Counseling given: Not Answered  Tobacco comments: trying to quit, wears patch      Vital Signs (Current): There were no vitals filed for this visit. BP Readings from Last 3 Encounters:   01/04/23 113/67   01/03/23 (!) 110/46   12/30/22 (!) 110/46       NPO Status:                                                                                 BMI:   Wt Readings from Last 3 Encounters:   12/29/22 110 lb (49.9 kg)   01/03/23 110 lb (49.9 kg)   12/30/22 110 lb (49.9 kg)     There is no height or weight on file to calculate BMI.    CBC:   Lab Results   Component Value Date/Time    WBC 7.2 12/30/2022 02:39 PM    RBC 4.57 12/30/2022 02:39 PM    HGB 14.2 12/30/2022 02:39 PM    HCT 42.6 12/30/2022 02:39 PM    MCV 93.2 12/30/2022 02:39 PM    RDW 13.0 12/30/2022 02:39 PM     12/30/2022 02:39 PM       CMP:   Lab Results   Component Value Date/Time     12/30/2022 02:39 PM    K 4.1 12/30/2022 02:39 PM     12/30/2022 02:39 PM    CO2 27 12/30/2022 02:39 PM    BUN 10 12/30/2022 02:39 PM    CREATININE 0.6 12/30/2022 02:39 PM    GFRAA >60 07/13/2022 02:02 PM    LABGLOM >60 12/30/2022 02:39 PM    GLUCOSE 108 12/30/2022 02:39 PM    PROT 6.8 12/30/2022 02:39 PM    CALCIUM 9.4 12/30/2022 02:39 PM    BILITOT 0.2 12/30/2022 02:39 PM    ALKPHOS 59 12/30/2022 02:39 PM    AST 14 12/30/2022 02:39 PM    ALT 7 12/30/2022 02:39 PM       POC Tests: No results for input(s): POCGLU, POCNA, POCK, POCCL, POCBUN, POCHEMO, POCHCT in the last 72 hours.     Coags: No results found for: PROTIME, INR, APTT    HCG (If Applicable):   Lab Results   Component Value Date    PREGTESTUR neg 11/29/2021        ABGs: No results found for: PHART, PO2ART, WUP7OZR, RVG2DER, BEART, D2PVKWYA     Type & Screen (If Applicable):  No results found for: VALERIO [facility]    Anesthesia Evaluation  Patient summary reviewed and Nursing notes reviewed no history of anesthetic complications:   Airway: Mallampati: II  TM distance: >3 FB   Neck ROM: full  Mouth opening: > = 3 FB   Dental:      Comment: Patient denies any loose or chipped teeth. Pulmonary: breath sounds clear to auscultation  (+) current smoker          Patient did not smoke on day of surgery. Cardiovascular:    (+) hypertension: moderate,       ECG reviewed  Rhythm: regular  Rate: normal           Beta Blocker:  Dose within 24 Hrs      ROS comment: EKG 2020    Normal sinus rhythm  Normal ECG  No previous ECGs available  Confirmed by Melodie Lantigua ([id number]) on 2020 6:07:46 AM     Neuro/Psych:   (+) headaches: migraine headaches, psychiatric history:depression/anxiety              ROS comment: Anxiety    Bipolar 1 disorder (HCC)   PTSD (post-traumatic stress disorder)        GI/Hepatic/Renal:   (+) GERD: well controlled,          ROS comment: Abnormal delay in gastric emptying  Pancreatic mass. Endo/Other:    (+) : arthritis: OA., .                  ROS comment: Small mucocele in the left buccal mucosa on the right upper lip. Abdominal:             Vascular: negative vascular ROS. Other Findings:             Anesthesia Plan      MAC     ASA 3       Induction: intravenous. Anesthetic plan and risks discussed with patient. Plan discussed with CRNA. Royetta Apgar, MD   2023    Chart reviewed . Patient assessed before induction. I agree with the above note.   FRENCH Cartagena - CRNA (3) occasionally moist

## 2023-10-14 NOTE — PATIENT PROFILE ADULT - NSPROSPIRITUALVALUESFT_GEN_A_NUR
A: 72 y/o Female seen for the following:   -Chronic BLE Lymphedema   -BLE Elephantiasis nostras verrucosa  -DM2  -Difficulty with ambulation    P:   Chart reviewed and Patient evaluated;  Discussed diagnosis and treatment with patient. Discussed importance of daily foot examinations, proper shoe gear, importance of tight glycemic control.   Flushed interdigitally with NS  Applied betadine gauze interdigitally with DSD to Ryan feet  Significant generalized verrucous changes to skin extending from bilateral below the knee to feet, cobblestone like nodules to BLE, interdigital maceration and malodor. Underlying elephantiasis nostras verrucosa caused by chronic BLE lymphedema  Elevate BLE  Dermatology consult and recommendations appreciated.   Ordered mupirocin, acetic acid 0.25%, Hibiclens to be used every other day on the ryan LE  Continue with antibiotics as per ID  All additional care per Med appreciated  Patient demonstrated verbal understanding of all interventions and tolerated interventions well without any complications.   Podiatry will follow while in house      Case D/W attending Dr. Mitchell  A: 72 y/o Female seen for the following:   -Chronic BLE Lymphedema   -BLE Elephantiasis nostras verrucosa  -DM2  -Difficulty with ambulation    P:   Chart reviewed and Patient evaluated;  Discussed diagnosis and treatment with patient. Discussed importance of daily foot examinations, proper shoe gear, importance of tight glycemic control.   Significant generalized verrucous changes to skin extending from bilateral below the knee to feet, cobblestone like nodules to BLE, interdigital maceration and malodor. Underlying elephantiasis nostras verrucosa caused by chronic BLE lymphedema  Elevate BLE  Dermatology consult and recommendations appreciated.   WC: betadine gauze interdigitally with DSD to Ryan feet  Ordered mupirocin, acetic acid 0.25%, Hibiclens to be used every other day on the ryan LE  Continue with antibiotics as per ID  All additional care per Med appreciated  Patient demonstrated verbal understanding of all interventions and tolerated interventions well without any complications.   Podiatry will follow while in house      Case D/W attending Dr. Mitchell  none

## 2025-07-26 ENCOUNTER — APPOINTMENT (OUTPATIENT)
Dept: CT IMAGING | Facility: IMAGING CENTER | Age: 70
End: 2025-07-26
Payer: MEDICARE

## 2025-07-26 ENCOUNTER — OUTPATIENT (OUTPATIENT)
Dept: OUTPATIENT SERVICES | Facility: HOSPITAL | Age: 70
LOS: 1 days | End: 2025-07-26
Payer: MEDICARE

## 2025-07-26 DIAGNOSIS — Z00.8 ENCOUNTER FOR OTHER GENERAL EXAMINATION: ICD-10-CM

## 2025-07-26 DIAGNOSIS — Z90.49 ACQUIRED ABSENCE OF OTHER SPECIFIED PARTS OF DIGESTIVE TRACT: Chronic | ICD-10-CM

## 2025-07-26 PROCEDURE — 70486 CT MAXILLOFACIAL W/O DYE: CPT

## 2025-07-26 PROCEDURE — 70486 CT MAXILLOFACIAL W/O DYE: CPT | Mod: 26
